# Patient Record
Sex: FEMALE | Race: WHITE | NOT HISPANIC OR LATINO | Employment: FULL TIME | URBAN - METROPOLITAN AREA
[De-identification: names, ages, dates, MRNs, and addresses within clinical notes are randomized per-mention and may not be internally consistent; named-entity substitution may affect disease eponyms.]

---

## 2017-04-02 ENCOUNTER — HOSPITAL ENCOUNTER (INPATIENT)
Facility: HOSPITAL | Age: 52
LOS: 5 days | Discharge: HOME | DRG: 885 | End: 2017-04-07
Attending: PSYCHIATRY & NEUROLOGY | Admitting: PSYCHIATRY & NEUROLOGY
Payer: MEDICAID

## 2017-04-02 DIAGNOSIS — R45.851 DEPRESSION WITH SUICIDAL IDEATION: ICD-10-CM

## 2017-04-02 DIAGNOSIS — F33.2 MDD (MAJOR DEPRESSIVE DISORDER), RECURRENT SEVERE, WITHOUT PSYCHOSIS: Primary | ICD-10-CM

## 2017-04-02 DIAGNOSIS — F32.A DEPRESSION WITH SUICIDAL IDEATION: ICD-10-CM

## 2017-04-02 DIAGNOSIS — I10 ESSENTIAL HYPERTENSION: ICD-10-CM

## 2017-04-02 LAB — TSH SERPL DL<=0.005 MIU/L-ACNC: 1.69 UIU/ML

## 2017-04-02 PROCEDURE — 80061 LIPID PANEL: CPT

## 2017-04-02 PROCEDURE — 11400000 HC PSYCH PRIVATE ROOM

## 2017-04-02 PROCEDURE — 36415 COLL VENOUS BLD VENIPUNCTURE: CPT

## 2017-04-02 PROCEDURE — 86592 SYPHILIS TEST NON-TREP QUAL: CPT

## 2017-04-02 PROCEDURE — 25000003 PHARM REV CODE 250: Performed by: SURGERY

## 2017-04-02 PROCEDURE — 27000339 *HC DAILY SUPPLY KIT

## 2017-04-02 PROCEDURE — 82947 ASSAY GLUCOSE BLOOD QUANT: CPT

## 2017-04-02 PROCEDURE — 84443 ASSAY THYROID STIM HORMONE: CPT

## 2017-04-02 RX ORDER — IBUPROFEN 200 MG
1 TABLET ORAL DAILY
Status: DISCONTINUED | OUTPATIENT
Start: 2017-04-02 | End: 2017-04-07 | Stop reason: HOSPADM

## 2017-04-02 RX ORDER — OLANZAPINE 10 MG/2ML
10 INJECTION, POWDER, FOR SOLUTION INTRAMUSCULAR ONCE AS NEEDED
Status: ACTIVE | OUTPATIENT
Start: 2017-04-02 | End: 2017-04-02

## 2017-04-02 RX ORDER — HYDROXYZINE PAMOATE 50 MG/1
50 CAPSULE ORAL EVERY 6 HOURS PRN
Status: DISCONTINUED | OUTPATIENT
Start: 2017-04-02 | End: 2017-04-04

## 2017-04-02 RX ORDER — HYDROCHLOROTHIAZIDE 12.5 MG/1
25 TABLET ORAL DAILY
Status: ON HOLD | COMMUNITY
End: 2017-04-07

## 2017-04-02 RX ORDER — CITALOPRAM 20 MG/1
20 TABLET, FILM COATED ORAL DAILY
Status: ON HOLD | COMMUNITY
End: 2017-04-07 | Stop reason: HOSPADM

## 2017-04-02 RX ADMIN — NICOTINE 1 PATCH: 21 PATCH, EXTENDED RELEASE TRANSDERMAL at 05:04

## 2017-04-02 NOTE — IP AVS SNAPSHOT
[address] [address]  Phone: [phone]           Patient Discharge Instructions   Our goal is to set you up for success. This packet includes information on your condition, medications, and your home care.  It will help you care for yourself to prevent having to return to the hospital.     Please ask your nurse if you have any questions.      There are many details to remember when preparing to leave the hospital. Here is what you will need to do:    1. Take your medicine. If you are prescribed medications, review your Medication List on the following pages. You may have new medications to  at the pharmacy and others that you'll need to stop taking. Review the instructions for how and when to take your medications. Talk with your doctor or nurses if you are unsure of what to do.     2. Go to your follow-up appointments. Specific follow-up information is listed in the following pages. Your may be contacted by a nurse or clinical provider about future appointments. Be sure we have all of the phone numbers to reach you. Please contact your provider's office if you are unable to make an appointment.     3. Watch for warning signs. Your doctor or nurse will give you detailed warning signs to watch for and when to call for assistance. These instructions may also include educational information about your condition. If you experience any of warning signs to your health, call your doctor.               ** Verify the list of medication(s) below is accurate and up to date. Carry this with you in case of emergency. If your medications have changed, please notify your healthcare provider.             Medication List      START taking these medications        Additional Info                      buPROPion 150 MG TB24 tablet   Commonly known as:  WELLBUTRIN XL   Quantity:  30 tablet   Refills:  1   Dose:  150 mg    Last time this was given:  150 mg on 4/7/2017  8:09 AM    Instructions:  Take 1 tablet (150 mg total) by mouth once daily.     Begin Date    AM    Noon    PM    Bedtime       hydrOXYzine 100 MG capsule   Commonly known as:  VISTARIL   Quantity:  30 capsule   Refills:  1   Dose:  100 mg    Last time this was given:  100 mg on 4/6/2017  8:32 PM   Instructions:  Take 1 capsule (100 mg total) by mouth every evening.     Begin Date    AM    Noon    PM    Bedtime       nicotine 21 mg/24 hr   Commonly known as:  NICODERM CQ   Refills:  0   Dose:  1 patch    Last time this was given:  1 patch on 4/7/2017  8:10 AM   Instructions:  Place 1 patch onto the skin once daily.     Begin Date    AM    Noon    PM    Bedtime         CHANGE how you take these medications        Additional Info                      citalopram 20 MG tablet   Commonly known as:  CELEXA   Quantity:  45 tablet   Refills:  1   Dose:  30 mg   What changed:  how much to take    Last time this was given:  30 mg on 4/7/2017  8:09 AM   Instructions:  Take 1.5 tablets (30 mg total) by mouth once daily.     Begin Date    AM    Noon    PM    Bedtime         CONTINUE taking these medications        Additional Info                      hydrochlorothiazide 12.5 MG Tab   Commonly known as:  HYDRODIURIL   Quantity:  30 tablet   Refills:  1   Dose:  25 mg    Last time this was given:  25 mg on 4/7/2017  8:09 AM   Instructions:  Take 2 tablets (25 mg total) by mouth once daily.     Begin Date    AM    Noon    PM    Bedtime         STOP taking these medications     lisinopril-hydrochlorothiazide 10-12.5 mg per tablet   Commonly known as:  PRINZIDE,ZESTORETIC       meloxicam 15 MG tablet   Commonly known as:  MOBIC            Where to Get Your Medications      You can get these medications from any pharmacy     Bring a paper prescription for each of these medications     buPROPion 150 MG TB24 tablet    citalopram 20 MG tablet    hydrochlorothiazide 12.5 MG Tab    hydrOXYzine 100 MG capsule       You don't need a prescription for  "these medications     nicotine 21 mg/24 hr                  Please bring to all follow up appointments:    1. A copy of your discharge instructions.  2. All medicines you are currently taking in their original bottles.  3. Identification and insurance card.    Please arrive 15 minutes ahead of scheduled appointment time.    Please call 24 hours in advance if you must reschedule your appointment and/or time.        Follow-up Information     Follow up with [facility]    Contact information:    [facility]   Transitional Living   [phone]  Contact: Juan Millan         Follow up with [facility] On 4/13/2017.    Specialties:  Behavioral Health, Psychiatry, Psychology    Why:  Outpatient Psych Services, as scheduled AT 10 am     Contact information:    [address] [place] [address] [phone]          Discharge References/Attachments     BUPROPION EXTENDED-RELEASE TABLETS (DEPRESSION/MOOD DISORDERS) (ENGLISH)    CITALOPRAM TABLETS (ENGLISH)    SMOKING, HEALTH EFFECTS OF (ENGLISH)    SMOKING, GETTING SUPPORT FOR QUITTING (ENGLISH)    HYDROCHLOROTHIAZIDE, HCTZ CAPSULES OR TABLETS (ENGLISH)    HYDROXYZINE CAPSULES OR TABLETS (ENGLISH)    DEPRESSION, KNOW THE SIGNS AND SYMPTOMS (ENGLISH)    SUICIDE, RECOGNIZING WARNING SIGNS IN YOURSELF (ENGLISH)        Primary Diagnosis     Your primary diagnosis was:  Mood Problem      Admission Information     Date & Time Department CSN    4/2/2017  1:38 PM [facility] [id number]       Admisson Diagnosis: Depression with suicidal ideation      Care Providers     Not on file      Your Vitals Were     BP Pulse Temp Resp Height Weight    145/89 (BP Location: Left arm, Patient Position: Sitting, BP Method: Automatic) 71 97.1 °F (36.2 °C) (Oral) 18 5' 5" (1.651 m) 121.8 kg (268 lb 8 oz)    BMI                44.68 kg/m2          Recent Lab Values     No lab values to display.      Blood Glucose and Lipid Panel Labs        " 4/2/2017                           4:33 PM           Cholesterol 275 (H)           Triglycerides 194 (H)           HDL Cholesterol 41           LDL Cholesterol 195.2 (H)           HDL/Cholesterol Ratio 14.9 (L)           Total Cholesterol/HDL Ratio 6.7 (H)           Non-HDL Cholesterol 234           Comment for Cholesterol at  4:33 PM on 4/2/2017:  The National Cholesterol Education Program (NCEP) has set the  following guidelines (reference ranges) for Cholesterol:  Optimal.....................<200 mg/dL  Borderline High.............200-239 mg/dL  High........................> or = 240 mg/dL      Comment for Triglycerides at  4:33 PM on 4/2/2017:  The National Cholesterol Education Program (NCEP) has set the  following guidelines (reference values) for triglycerides:  Normal......................<150 mg/dL  Borderline High.............150-199 mg/dL  High........................200-499 mg/dL      Comment for HDL Cholesterol at  4:33 PM on 4/2/2017:  The National Cholesterol Education Program (NCEP) has set the  following guidelines (reference values) for HDL Cholesterol:  Low...............<40 mg/dL  Optimal...........>60 mg/dL      Comment for LDL Cholesterol at  4:33 PM on 4/2/2017:  The National Cholesterol Education Program (NCEP) has set the  following guidelines (reference values) for LDL Cholesterol:  Optimal.......................<130 mg/dL  Borderline High...............130-159 mg/dL  High..........................160-189 mg/dL  Very High.....................>190 mg/dL      Comment for Non-HDL Cholesterol at  4:33 PM on 4/2/2017:  Risk category and Non-HDL cholesterol goals:  Coronary heart disease (CHD)or equivalent (10-year risk of CHD >20%):  Non-HDL cholesterol goal     <130 mg/dL  Two or more CHD risk factors and 10-year risk of CHD <= 20%:  Non-HDL cholesterol goal     <160 mg/dL  0 to 1 CHD risk factor:  Non-HDL cholesterol goal     <190 mg/dL        Allergies as of 4/7/2017     No Known Allergies       Advance Directives     An advance directive is a document which, in the event you are no longer able to make decisions for yourself, tells your healthcare team what kind of treatment you do or do not want to receive, or who you would like to make those decisions for you.  If you do not currently have an advance directive, [facility] encourages you to create one.  For more information call:  [phone]), [phone] ([phone]),  or log on to [email or web address].        Advance Directive Status          Most Recent Value    Advance Directive Status  Patient does not have Advance Directive, declines information.      Language Assistance Services     ATTENTION: Language assistance services are available, free of charge. Please call [phone].      ATENCIÓN: Si habla español, tiene a tomas disposición servicios gratuitos de asistencia lingüística. Llame al [phone].     CHÚ Ý: N?u b?n nói Ti?ng Vi?t, có các d?ch v? h? tr? ngôn ng? mi?n phí dành cho b?n. G?i s? [phone].        National Suicide Prevention Lifeline     If you or someone you know is thinking about suicide, call the National Suicide Prevention Lifeline.  National Suicide Hotline: 9-953-382-TALK (4688)  The lifeline is free, confidential and always available.  Help a loved one, a friend or yourself.  www.suicideprevenetionlifeline.org          [facility] Sign-Up     Activating your [facility] account is as easy as 1-2-3!     1) Visit [email or web address], select Sign Up Now, enter this activation code and your date of birth, then select Next.  [email or web address]  Expires: 5/20/2017  2:05 PM      2) Create a username and password to use when you visit [facility] in the future and select a security question in case you lose your password and select Next.    3) Enter your e-mail address and click Sign Up!    Additional Information  If you have questions, please e-mail [facility] [email or web address] or call [phone] to talk to our  [facility] staff. Remember, [facility] is NOT to be used for urgent needs. For medical emergencies, dial 911.          [facility] complies with applicable Federal civil rights laws and does not discriminate on the basis of race, color, national origin, age, disability, or sex.

## 2017-04-03 PROBLEM — I10 ESSENTIAL HYPERTENSION: Status: ACTIVE | Noted: 2017-04-03

## 2017-04-03 LAB
CHOLEST/HDLC SERPL: 6.7 {RATIO}
GLUCOSE SERPL-MCNC: 104 MG/DL
HDL/CHOLESTEROL RATIO: 14.9 %
HDLC SERPL-MCNC: 275 MG/DL
HDLC SERPL-MCNC: 41 MG/DL
LDLC SERPL CALC-MCNC: 195.2 MG/DL
NONHDLC SERPL-MCNC: 234 MG/DL
RPR SER QL: NORMAL
TRIGL SERPL-MCNC: 194 MG/DL

## 2017-04-03 PROCEDURE — 11400000 HC PSYCH PRIVATE ROOM

## 2017-04-03 PROCEDURE — 27000339 *HC DAILY SUPPLY KIT

## 2017-04-03 PROCEDURE — 99232 SBSQ HOSP IP/OBS MODERATE 35: CPT | Mod: ,,, | Performed by: INTERNAL MEDICINE

## 2017-04-03 PROCEDURE — 36415 COLL VENOUS BLD VENIPUNCTURE: CPT

## 2017-04-03 PROCEDURE — 90833 PSYTX W PT W E/M 30 MIN: CPT | Mod: AF,S$PBB,, | Performed by: PSYCHIATRY & NEUROLOGY

## 2017-04-03 PROCEDURE — 25000003 PHARM REV CODE 250: Performed by: SURGERY

## 2017-04-03 PROCEDURE — 25000003 PHARM REV CODE 250: Performed by: PSYCHIATRY & NEUROLOGY

## 2017-04-03 PROCEDURE — 99223 1ST HOSP IP/OBS HIGH 75: CPT | Mod: AF,S$PBB,, | Performed by: PSYCHIATRY & NEUROLOGY

## 2017-04-03 RX ORDER — ACETAMINOPHEN 325 MG/1
650 TABLET ORAL EVERY 6 HOURS PRN
Status: DISCONTINUED | OUTPATIENT
Start: 2017-04-03 | End: 2017-04-07 | Stop reason: HOSPADM

## 2017-04-03 RX ORDER — LOPERAMIDE HYDROCHLORIDE 2 MG/1
2 CAPSULE ORAL
Status: DISCONTINUED | OUTPATIENT
Start: 2017-04-03 | End: 2017-04-07 | Stop reason: HOSPADM

## 2017-04-03 RX ORDER — MAG HYDROX/ALUMINUM HYD/SIMETH 200-200-20
30 SUSPENSION, ORAL (FINAL DOSE FORM) ORAL EVERY 6 HOURS PRN
Status: DISCONTINUED | OUTPATIENT
Start: 2017-04-03 | End: 2017-04-07 | Stop reason: HOSPADM

## 2017-04-03 RX ORDER — CITALOPRAM 10 MG/1
20 TABLET ORAL DAILY
Status: DISCONTINUED | OUTPATIENT
Start: 2017-04-03 | End: 2017-04-04

## 2017-04-03 RX ORDER — OLANZAPINE 10 MG/1
10 TABLET ORAL EVERY 4 HOURS PRN
Status: DISCONTINUED | OUTPATIENT
Start: 2017-04-03 | End: 2017-04-07 | Stop reason: HOSPADM

## 2017-04-03 RX ORDER — HYDROCHLOROTHIAZIDE 25 MG/1
25 TABLET ORAL DAILY
Status: DISCONTINUED | OUTPATIENT
Start: 2017-04-03 | End: 2017-04-07 | Stop reason: HOSPADM

## 2017-04-03 RX ORDER — IBUPROFEN 200 MG
1 TABLET ORAL DAILY PRN
Status: DISCONTINUED | OUTPATIENT
Start: 2017-04-03 | End: 2017-04-03

## 2017-04-03 RX ORDER — DOCUSATE SODIUM 100 MG/1
100 CAPSULE, LIQUID FILLED ORAL DAILY PRN
Status: DISCONTINUED | OUTPATIENT
Start: 2017-04-03 | End: 2017-04-07 | Stop reason: HOSPADM

## 2017-04-03 RX ORDER — OLANZAPINE 10 MG/2ML
10 INJECTION, POWDER, FOR SOLUTION INTRAMUSCULAR EVERY 4 HOURS PRN
Status: DISCONTINUED | OUTPATIENT
Start: 2017-04-03 | End: 2017-04-07 | Stop reason: HOSPADM

## 2017-04-03 RX ORDER — BUPROPION HYDROCHLORIDE 150 MG/1
150 TABLET ORAL DAILY
Status: DISCONTINUED | OUTPATIENT
Start: 2017-04-03 | End: 2017-04-06

## 2017-04-03 RX ORDER — FOLIC ACID 1 MG/1
1 TABLET ORAL DAILY
Status: DISCONTINUED | OUTPATIENT
Start: 2017-04-03 | End: 2017-04-03

## 2017-04-03 RX ADMIN — HYDROXYZINE PAMOATE 50 MG: 50 CAPSULE ORAL at 11:04

## 2017-04-03 RX ADMIN — CITALOPRAM HYDROBROMIDE 20 MG: 10 TABLET ORAL at 11:04

## 2017-04-03 RX ADMIN — HYDROCHLOROTHIAZIDE 25 MG: 25 TABLET ORAL at 11:04

## 2017-04-03 RX ADMIN — NICOTINE 1 PATCH: 21 PATCH, EXTENDED RELEASE TRANSDERMAL at 08:04

## 2017-04-03 RX ADMIN — BUPROPION HYDROCHLORIDE 150 MG: 150 TABLET, FILM COATED, EXTENDED RELEASE ORAL at 11:04

## 2017-04-03 RX ADMIN — THERA TABS 1 TABLET: TAB at 11:04

## 2017-04-03 NOTE — PLAN OF CARE
"Problem: Patient Care Overview (Adult)  Goal: Interdisciplinary Rounds/Family Conf  Outcome: Ongoing (interventions implemented as appropriate)  Treatment Team Update:     Chief Complaint:  Pt was PEC'd for depression with SI and a plan. Pt wrote a suicide note apologizing to her family and giving away her belongings.     Review of Progress/Goals:  Under financial stress. States she has been laid off 6 weeks; been receiving unemployment- about 200 a month. States she was unable to pay her rent March 1 and was evicted so is homeless. States her mother has been sick for 5 and half yrs since she had a stroke and her dad is her care giver. They live in [place].  Not living near her parents has not been able to help, I guess it makes me feel guilty. Has 2 brothers and a friend she could possibly stay with. States  loneliness, sadness, frustration, and no outlet to vent are her stressors. Plus she's been dealing with "a sort of crazy relationship with a artemio that's been in and out of [facility]. He will be released in august -I've spent a lotta money on him thru out the years. Didn't give him anything last year. I'm sure he anticipates coming home to wherever i'll be at that timte. It's a toxic relationship and I don't know how to end it."   Patient states she worked at  in [employer]; previously in [facility] 10 yrs. Recent job as  - civil proceedings -was there year and half  - had worked there before 15yrs. "They couldnt meet payroll, budget issue, there were layoffs;   Is aware of LRS - been in that for 4 weeks   Patient unsure of where she will go when discharged, states she can't live with her parents, and is very limited; but doesn't think she wd have to go to a shelter.     Affect/Mood:  Improved appearance (showered, dressed).   Constricted, tearful      Thought Process:  Linear     Medication Current/Changes:  Non compliant with medications; started it again in January 20mg "   Blood pressure medicine         Revisions to Goals:     Estimated LOS:  3-7 days      Discharge Plan:  D/C to      Referrals:  LRS   [facility] ([place] [place])

## 2017-04-03 NOTE — PLAN OF CARE
Problem: Overarching Goals (Adult)  Goal: Develops/Participates in Therapeutic Gratis to Support Successful Transition  Outcome: Ongoing (interventions implemented as appropriate)  Group Note     Behavior:              Intervention:  Twenty Questions            Response:              Plan:

## 2017-04-03 NOTE — PLAN OF CARE
Problem: Patient Care Overview (Adult)  Goal: Plan of Care Review  Outcome: Ongoing (interventions implemented as appropriate)  Out on the unit in dayroom watching TV, quiet.  Encouraged to verbalize feelings.  Did state that she hopes to be able to come to resolutions while here.  Admits to feeling hopeless and feeling she had nowhere to turn.  Praised her for staying out of her room and being a part of the happenings on the unit.  Will continue to monitor.  Safety and precautions maintained, bed low and pathway kept clear.

## 2017-04-03 NOTE — NURSING
"Pt arrived to [facility] via wheelchair per transportation company from [facility] ER accompanied by staff and security. All belongings searched. No contraband found. Calm and cooperative. Pt was PEC'd due to suicidal ideations with a plan. She wrote a suicide note. Tearful and unkempt. Pt stated, "I am just at the end of my rope. I don't know where to go or where to turn. I was evicted from my home. I knew I had to leave by Monday. All my stuff was moved out today. All of a sudden reality just hit me." Pt was laid off from her job at the [employer] office 6 weeks ago. She has been unable to pay her bills on the money she has been getting from unemployment. Pt reports she has no support system. She has a 35 year old son. Her parents are elderly and ill. Pt is . No history of psychiatric inpatient care. No history of attempts. Has been taking Celexa and HCTZ  as prescribed. Pt reports it has not been helping her depression. Pt denies SI or HI or plans at this time. Oriented to unit and rules. Pt rights reviewed. Pt reports she needs help with depression, housing and a job.   "
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
## 2017-04-03 NOTE — SUBJECTIVE & OBJECTIVE
Past Medical History:   Diagnosis Date    Hypertension        History reviewed. No pertinent surgical history.    Review of patient's allergies indicates:  No Known Allergies    No current facility-administered medications on file prior to encounter.      Current Outpatient Prescriptions on File Prior to Encounter   Medication Sig    meloxicam (MOBIC) 15 MG tablet Take 15 mg by mouth once daily.    [DISCONTINUED] lisinopril-hydrochlorothiazide (PRINZIDE,ZESTORETIC) 10-12.5 mg per tablet Take 1 tablet by mouth once daily.     Family History     None        Social History Main Topics    Smoking status: Current Every Day Smoker     Packs/day: 1.00    Smokeless tobacco: Not on file    Alcohol use Not on file    Drug use: Not on file    Sexual activity: Not on file     Review of Systems   Constitutional: Negative for chills, fatigue, fever and unexpected weight change.   HENT: Negative for congestion, ear pain, sore throat and trouble swallowing.    Eyes: Negative for pain and visual disturbance.   Respiratory: Negative for cough, chest tightness and shortness of breath.    Cardiovascular: Negative for chest pain, palpitations and leg swelling.   Gastrointestinal: Negative for abdominal distention, abdominal pain, constipation, diarrhea and vomiting.   Genitourinary: Negative for difficulty urinating, dysuria, flank pain, frequency and hematuria.   Musculoskeletal: Negative for back pain, gait problem, joint swelling, neck pain and neck stiffness.   Skin: Negative for rash and wound.   Neurological: Negative for dizziness, seizures, speech difficulty, light-headedness and headaches.     Objective:     Vital Signs (Most Recent):  Temp: 96.8 °F (36 °C) (04/03/17 0825)  Pulse: 76 (04/03/17 0825)  Resp: 18 (04/03/17 0825)  BP: (!) 141/85 (04/03/17 0825) Vital Signs (24h Range):  Temp:  [96.2 °F (35.7 °C)-97.5 °F (36.4 °C)] 96.8 °F (36 °C)  Pulse:  [54-76] 76  Resp:  [16-18] 18  BP: (138-154)/(82-88) 141/85      Weight: 119.7 kg (264 lb)  Body mass index is 43.93 kg/(m^2).    Physical Exam   Constitutional: She is oriented to person, place, and time. She appears well-developed and well-nourished.   HENT:   Head: Normocephalic and atraumatic.   Neck: No thyromegaly present.   Cardiovascular: Normal rate, regular rhythm, normal heart sounds and intact distal pulses.    No murmur heard.  Pulmonary/Chest: Effort normal and breath sounds normal. No respiratory distress. She has no wheezes. She has no rales.   Abdominal: Soft. Bowel sounds are normal. She exhibits no distension and no mass. There is no tenderness.   Musculoskeletal: Normal range of motion. She exhibits no edema.   Lymphadenopathy:     She has no cervical adenopathy.   Neurological: She is alert and oriented to person, place, and time.     Neuro: Cranial nerves:  CN II Visual fields full to confrontation.   CN III, IV, VI Pupils are equal, round, and reactive to light.  CN III: no palsy  Nystagmus: none   Diplopia: none  Ophthalmoparesis: none  CN V Facial sensation intact.   CN VII Facial expression full, symmetric.   CN VIII normal.   CN IX normal.   CN X normal.   CN XI normal.   CN XII normal.         Skin: Skin is warm and dry. No erythema.   Vitals reviewed.      Significant Labs:   CBC: wbc 11, h/h 15/46, plt 232  CMP: Na 139, K 3.8, BUN/creat 12/0.8, glucose 104  U/A -  LFT WNL  UDS -  acetaminophen <0.6  Salicylate <5.0  Etoh 76  Lab Results   Component Value Date    TSH 1.689 04/02/2017

## 2017-04-03 NOTE — CONSULTS
[facility] Hospital Medicine  Consult Note    Patient Name: Selam Bird  MRN: [id number]  Admission Date: 4/2/2017  Hospital Length of Stay: 1 days  Attending Physician: Raj Rubio MD   Primary Care Provider: Darryl Tobias NP           Patient information was obtained from patient and ER records.     Inpatient consult to [facility] for History and Physical  Consult performed by: MATEO FONSECA  Consult ordered by: RAJ RUBIO        Subjective:     Principal Problem: <principal problem not specified>    Chief Complaint: No chief complaint on file.       HPI: Pt was PEC'd due to suicidal ideations with a plan. She wrote a suicide note.    She was admitted to [facility] and medicine consulted for H/P. VSS/afebrile. She is on HCTZ at home and usually has well controlled bp      Past Medical History:   Diagnosis Date    Hypertension        History reviewed. No pertinent surgical history.    Review of patient's allergies indicates:  No Known Allergies    No current facility-administered medications on file prior to encounter.      Current Outpatient Prescriptions on File Prior to Encounter   Medication Sig    meloxicam (MOBIC) 15 MG tablet Take 15 mg by mouth once daily.    [DISCONTINUED] lisinopril-hydrochlorothiazide (PRINZIDE,ZESTORETIC) 10-12.5 mg per tablet Take 1 tablet by mouth once daily.     Family History     None        Social History Main Topics    Smoking status: Current Every Day Smoker     Packs/day: 1.00    Smokeless tobacco: Not on file    Alcohol use Not on file    Drug use: Not on file    Sexual activity: Not on file     Review of Systems   Constitutional: Negative for chills, fatigue, fever and unexpected weight change.   HENT: Negative for congestion, ear pain, sore throat and trouble swallowing.    Eyes: Negative for pain and visual disturbance.   Respiratory: Negative for cough, chest tightness and shortness of breath.    Cardiovascular: Negative  for chest pain, palpitations and leg swelling.   Gastrointestinal: Negative for abdominal distention, abdominal pain, constipation, diarrhea and vomiting.   Genitourinary: Negative for difficulty urinating, dysuria, flank pain, frequency and hematuria.   Musculoskeletal: Negative for back pain, gait problem, joint swelling, neck pain and neck stiffness.   Skin: Negative for rash and wound.   Neurological: Negative for dizziness, seizures, speech difficulty, light-headedness and headaches.     Objective:     Vital Signs (Most Recent):  Temp: 96.8 °F (36 °C) (04/03/17 0825)  Pulse: 76 (04/03/17 0825)  Resp: 18 (04/03/17 0825)  BP: (!) 141/85 (04/03/17 0825) Vital Signs (24h Range):  Temp:  [96.2 °F (35.7 °C)-97.5 °F (36.4 °C)] 96.8 °F (36 °C)  Pulse:  [54-76] 76  Resp:  [16-18] 18  BP: (138-154)/(82-88) 141/85     Weight: 119.7 kg (264 lb)  Body mass index is 43.93 kg/(m^2).    Physical Exam   Constitutional: She is oriented to person, place, and time. She appears well-developed and well-nourished.   HENT:   Head: Normocephalic and atraumatic.   Neck: No thyromegaly present.   Cardiovascular: Normal rate, regular rhythm, normal heart sounds and intact distal pulses.    No murmur heard.  Pulmonary/Chest: Effort normal and breath sounds normal. No respiratory distress. She has no wheezes. She has no rales.   Abdominal: Soft. Bowel sounds are normal. She exhibits no distension and no mass. There is no tenderness.   Musculoskeletal: Normal range of motion. She exhibits no edema.   Lymphadenopathy:     She has no cervical adenopathy.   Neurological: She is alert and oriented to person, place, and time.     Neuro: Cranial nerves:  CN II Visual fields full to confrontation.   CN III, IV, VI Pupils are equal, round, and reactive to light.  CN III: no palsy  Nystagmus: none   Diplopia: none  Ophthalmoparesis: none  CN V Facial sensation intact.   CN VII Facial expression full, symmetric.   CN VIII normal.   CN IX normal.   CN  X normal.   CN XI normal.   CN XII normal.         Skin: Skin is warm and dry. No erythema.   Vitals reviewed.      Significant Labs:   CBC: wbc 11, h/h 15/46, plt 232  CMP: Na 139, K 3.8, BUN/creat 12/0.8, glucose 104  U/A -  LFT WNL  UDS -  acetaminophen <0.6  Salicylate <5.0  Etoh 76  Lab Results   Component Value Date    TSH 1.689 04/02/2017           Assessment/Plan:     Depression with suicidal ideation  Further orders per psych      Essential hypertension  Cont HCTZ      VTE Risk Mitigation     None        Thank you for your consult. I will sign off. Please contact us if you have any additional questions.    No Coronel NP  Department of Hospital Medicine   [facility]

## 2017-04-03 NOTE — PLAN OF CARE
Problem: Patient Care Overview (Adult)  Goal: Plan of Care Review  Outcome: Ongoing (interventions implemented as appropriate)  Pt out on unit using phone.Pt participating in groups.Reports sleeping ok last night.Hygiene and grooming good.Mood depressed but currently no si.Medication compliant.

## 2017-04-03 NOTE — PLAN OF CARE
Problem: Patient Care Overview (Adult)  Goal: Plan of Care Review  Outcome: Ongoing (interventions implemented as appropriate)  Lying quiet in bed, eyes closed, respiration even and unlabored, appearing asleep.  Slept most all shift with one awakening at approx midnight to 0130.    Safety and precautions maintained with rounds every 15 minutes, bed is fixed in a low position and pathways kept clear.  No fall occurred. Slept approx 6.5 hours.

## 2017-04-03 NOTE — NURSING
Dr. Rubio accepted pt for admission. Received report from Doretha RICK at [facility] ER. Pt was PEC'd for depression with SI and a plan. Pt wrote a suicide note apologizing to her family and giving away her belongings. Pt was medically cleared.
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
## 2017-04-04 PROCEDURE — 25000003 PHARM REV CODE 250: Performed by: SURGERY

## 2017-04-04 PROCEDURE — 25000003 PHARM REV CODE 250: Performed by: PSYCHIATRY & NEUROLOGY

## 2017-04-04 PROCEDURE — 90833 PSYTX W PT W E/M 30 MIN: CPT | Mod: AF,S$PBB,, | Performed by: PSYCHIATRY & NEUROLOGY

## 2017-04-04 PROCEDURE — 99233 SBSQ HOSP IP/OBS HIGH 50: CPT | Mod: AF,S$PBB,, | Performed by: PSYCHIATRY & NEUROLOGY

## 2017-04-04 PROCEDURE — 11400000 HC PSYCH PRIVATE ROOM

## 2017-04-04 PROCEDURE — 27000339 *HC DAILY SUPPLY KIT

## 2017-04-04 RX ORDER — HYDROXYZINE PAMOATE 50 MG/1
50 CAPSULE ORAL EVERY 6 HOURS PRN
Status: DISCONTINUED | OUTPATIENT
Start: 2017-04-04 | End: 2017-04-04

## 2017-04-04 RX ORDER — CITALOPRAM 10 MG/1
30 TABLET ORAL DAILY
Status: DISCONTINUED | OUTPATIENT
Start: 2017-04-05 | End: 2017-04-07 | Stop reason: HOSPADM

## 2017-04-04 RX ORDER — HYDROXYZINE PAMOATE 50 MG/1
100 CAPSULE ORAL NIGHTLY PRN
Status: DISCONTINUED | OUTPATIENT
Start: 2017-04-04 | End: 2017-04-06

## 2017-04-04 RX ORDER — HYDROXYZINE PAMOATE 50 MG/1
50 CAPSULE ORAL EVERY 6 HOURS PRN
Status: DISCONTINUED | OUTPATIENT
Start: 2017-04-04 | End: 2017-04-07 | Stop reason: HOSPADM

## 2017-04-04 RX ADMIN — HYDROXYZINE PAMOATE 100 MG: 50 CAPSULE ORAL at 09:04

## 2017-04-04 RX ADMIN — NICOTINE 1 PATCH: 21 PATCH, EXTENDED RELEASE TRANSDERMAL at 08:04

## 2017-04-04 RX ADMIN — THERA TABS 1 TABLET: TAB at 08:04

## 2017-04-04 RX ADMIN — CITALOPRAM HYDROBROMIDE 20 MG: 10 TABLET ORAL at 08:04

## 2017-04-04 RX ADMIN — BUPROPION HYDROCHLORIDE 150 MG: 150 TABLET, FILM COATED, EXTENDED RELEASE ORAL at 08:04

## 2017-04-04 RX ADMIN — HYDROCHLOROTHIAZIDE 25 MG: 25 TABLET ORAL at 08:04
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
## 2017-04-04 NOTE — PLAN OF CARE
"  Treatment Recommendation:   1:1 Intervention (as needed)    Cognitive Stimulation Skilled Activity  Creative Expression Skilled Activity  Mild Exercises Skilled Activity  Stress Management Skilled Activity  Coping Skilled Activity  Music Skilled Activity    Treatment Goal(s):  Long Term Goals Refer To Master Treatment Plan    Short Term Treatment Goal(s)  Patient Will:  Exhibit Improvement in Mood  Demonstrate Constructive Expression of Feelings and Behavior  Identify at Least 2 Coping Skills or Leisure Skills to Reduce Depression and Hopelessness Upon Request from Therapist    Discharge Recommendations:  Encourage Patient to Utilize Coping Skills on a Regular Basis to Reduce the Risk of Decompensating and Re-Hospitalizations  Follow Up with After Care Appointments  Continue with Current Leisure Activities     Patient presents with less improved affect and "Feel good mood." Patient reports increase depression and stress due to job loss 6 weeks ago and being evicted from her rent house Sunday. Patient reports she is , currently single, has a high school education, wears reading glasses, is unemployed, recently evicted from home in [facility]. Patient verbalized main goal "Decrease stress, get finances in order and get a job."  "

## 2017-04-04 NOTE — PROGRESS NOTES
PSYCHIATRY DAILY INPATIENT PROGRESS NOTE  SUBSEQUENT HOSPITAL VISIT    ENCOUNTER DATE: 4/4/2017  SITE: [facility]    DATE OF ADMISSION: 4/2/2017  1:38 PM  LENGTH OF STAY: 2 days      HISTORY    CHIEF COMPLAINT   Selam Bird is a 51 y.o. female, seen during daily [place]ane[place] rounds on the inpatient unit.  Selam Bird presents with the chief complaint of Depression and suicidal ideation    HPI   (Elements: Location, Quality, Severity, Duration, Timing, Content, Modifying Factors, Associated Signs & Symptoms)    The patient was seen and examined. The chart was reviewed.    Staff reports no behavioral or management issues.     The patient has been compliant with treatment. The patient denied any side effects.    Continued Symptoms of Depression: +diminished mood and loss of interest/anhedonia; ++irritability, +diminished energy, +change in sleep, denies change in appetite, +diminished concentration or cognition or indecisiveness, denies PMA/R, +excessive guilt or +hopelessness or +worthlessness, +suicidal ideations      Continued Trouble with Sleep: less trouble with initiation, less difficulty with maintenance, denies early morning awakening with inability to return to sleep      Continued but less Suicidal ideations: less active ideations; denies organized plans, less future intentions; denies homicidal ideations      Denies Symptoms of psychosis: denies hallucinations, denies delusions, denies disorganized thinking, denies disorganized behavior or abnormal motor behavior, or negative symptoms       Denies Symptoms of ry or hypomania: denies elevated, expansive, or irritable mood with increased energy or activity; with no inflated self-esteem or grandiosity, denies decreased need for sleep, denies increased rate of speech, FOI or racing thoughts, denies distractibility, increased goal directed activity or PMA, denies risky/disinhibited behavior      Continued Symptoms of LISA: less excessive anxiety/worry/fear, with  less restlessness, denies fatigue, less poor concentration, less irritability, denies muscle tension, less sleep disturbance; denies causes functionally impairing distress         PSYCHOTHERAPY ADD-ON +[id number]   30 (16-37*) minutes    Time: 16 minutes  Participants: Met with patient    Therapeutic Intervention Type: behavior modifying psychotherapy, supportive psychotherapy  Why chosen therapy is appropriate versus another modality: relevant to diagnosis, patient responds to this modality, evidence based practice    Target symptoms: depression, anxiety   Primary focus: psychosocial stressors  Psychotherapeutic techniques: supportive, behavioral and cognitive techniques; psycho-education    Outcome monitoring methods: self-report, observation    Patient's response to intervention:  The patient's response to intervention is accepting.    Progress toward goals:  The patient's progress toward goals is fair .          ROS  General ROS: negative  Ophthalmic ROS: negative  ENT ROS: negative  Allergy and Immunology ROS: negative  Hematological and Lymphatic ROS: negative  Endocrine ROS: negative  Respiratory ROS: no cough, shortness of breath, or wheezing  Cardiovascular ROS: no chest pain or dyspnea on exertion  Gastrointestinal ROS: no abdominal pain, change in bowel habits, or black or bloody stools  Genito-Urinary ROS: no dysuria, trouble voiding, or hematuria  Musculoskeletal ROS: negative  Neurological ROS: no TIA or stroke symptoms  Dermatological ROS: negative    PAST MEDICAL HISTORY   Past Medical History:   Diagnosis Date    Hypertension            PSYCHOTROPIC MEDICATIONS   Scheduled Meds:   buPROPion  150 mg Oral Daily    citalopram  20 mg Oral Daily    hydrochlorothiazide  25 mg Oral Daily    multivitamin  1 tablet Oral Daily    nicotine  1 patch Transdermal Daily     Continuous Infusions:   PRN Meds:.acetaminophen, aluminum-magnesium hydroxide-simethicone, docusate sodium, hydrOXYzine pamoate, loperamide,  "olanzapine **AND** olanzapine        EXAMINATION    VITALS   Vitals:    04/04/17 0742   BP: (!) 152/72   Pulse: (!) 57   Resp: 18   Temp: 96.2 °F (35.7 °C)       CONSTITUTIONAL  General Appearance: WF, obese, in casual attire; NAD     MUSCULOSKELETAL  Muscle Strength and Tone: normal  Abnormal Involuntary Movements: none  Gait and Station: normal; non-ataxic     PSYCHIATRIC   Level of Consciousness: awake, alert  Orientation: p/p/t/s  Grooming: adequate to circumstances  Psychomotor Behavior: no PMA/R  Speech: nl r/t/v/s  Language: English fluent  Mood: "down but a little better"  Affect: less tearful, despondent, and anxious  Thought Process: linear and organized  Associations: intact; no ABBY  Thought Content: denied AVH/delusions; denied HI, less SI  Memory: intact to recent and remote events  Attention: intact to conversation; not distractible   Fund of Knowledge: age and education appropriate  Estimate if Intelligence: average based on work/education history, vocabulary and mental status exam  Insight: good- seeks help, recognizes symptoms  Judgment: good- no bx issues, compliant and cooperative    DIAGNOSTIC TESTING   Laboratory Results  No results found for this or any previous visit (from the past 24 hour(s)).      ASSESSMENT      IMPRESSION   MDD, recurrent, severe, without psychotic features  LISA  Nicotine Dependence      Psychosocial stressors              MEDICAL DECISION MAKING         PROBLEM LIST AND MANAGEMENT PLANS   Depression   Anxiety  Nicotine use  Psychosocial stressors     PRESCRIPTION DRUG MANAGEMENT  Compliance: yes  Side Effects: no  Regimen Adjustments:   Celexa to 30 mg po q day for depression and anxiety  Wellbutrin  mg po q day for depression and nicotine cessation     Psychosocial stressors: refer to [place] [employer]/Rehabilitation and/or temp service; patient on unemployment benefits; housing- will look into options (family, friends, ?); patient will try to sign up for Medicaid; will " refer for psychotherapy as outpatient        DIAGNOSTIC TESTING  Labs reviewed; follow up pending labs     Disposition:  -SW to assist with aftercare planning and collateral  -Once stable discharge home with outpatient follow up care and/or rehab  -Continue inpatient treatment under a PEC and/or CEC for danger to self as evident by depression with SI.       NEED FOR CONTINUED HOSPITALIZATION  Psychiatric illness continues to pose a potential threat to life or bodily function, of self or others, thereby requiring the need for continued inpatient psychiatric hospitalization: Yes    Protective inpatient pyschiatric hospitalization required while a safe disposition plan is enacted: Yes    Patient stabilized and ready for discharge from inpatient psychiatric unit: No      STAFF:   Raj Rubio MD  Psychiatry

## 2017-04-04 NOTE — PLAN OF CARE
Problem: Patient Care Overview (Adult)  Goal: Plan of Care Review  Outcome: Ongoing (interventions implemented as appropriate)  Pt is sleeping at this time and has slept 7 hours with one interruption.  Pt did receive hydroxyzine pamoate 50 mg po last night for c/o insomnia.  Med was effective.  NAD.  Resp even & unlabored.  Pathways clear and bed is low.  Q 15 minute safety checks ongoing.  All precautions maintained.

## 2017-04-04 NOTE — PLAN OF CARE
Problem: Patient Care Overview (Adult)  Goal: Plan of Care Review  Outcome: Ongoing (interventions implemented as appropriate)  Pt out on unit and participating in activities.Reports poor sleep last night.A air mattress placed on pt bed for comfort.Mood improve some and denies suicidal thoughts.Medication compliant.

## 2017-04-05 PROCEDURE — 25000003 PHARM REV CODE 250: Performed by: SURGERY

## 2017-04-05 PROCEDURE — 27000339 *HC DAILY SUPPLY KIT

## 2017-04-05 PROCEDURE — 90833 PSYTX W PT W E/M 30 MIN: CPT | Mod: AF,S$PBB,, | Performed by: PSYCHIATRY & NEUROLOGY

## 2017-04-05 PROCEDURE — 11400000 HC PSYCH PRIVATE ROOM

## 2017-04-05 PROCEDURE — 99233 SBSQ HOSP IP/OBS HIGH 50: CPT | Mod: AF,S$PBB,, | Performed by: PSYCHIATRY & NEUROLOGY

## 2017-04-05 PROCEDURE — 25000003 PHARM REV CODE 250: Performed by: PSYCHIATRY & NEUROLOGY

## 2017-04-05 RX ADMIN — NICOTINE 1 PATCH: 21 PATCH, EXTENDED RELEASE TRANSDERMAL at 08:04

## 2017-04-05 RX ADMIN — HYDROCHLOROTHIAZIDE 25 MG: 25 TABLET ORAL at 08:04

## 2017-04-05 RX ADMIN — HYDROXYZINE PAMOATE 50 MG: 50 CAPSULE ORAL at 08:04

## 2017-04-05 RX ADMIN — CITALOPRAM HYDROBROMIDE 30 MG: 10 TABLET ORAL at 08:04

## 2017-04-05 RX ADMIN — THERA TABS 1 TABLET: TAB at 08:04

## 2017-04-05 RX ADMIN — BUPROPION HYDROCHLORIDE 150 MG: 150 TABLET, FILM COATED, EXTENDED RELEASE ORAL at 08:04

## 2017-04-05 NOTE — NURSING
Patient resting quietly in bed with eyes closed.  Respirations easy and unlabored appears asleep.  Slept well for 6 hours.  Safety maintained with rounds every 15 minutes. Bed at lowest position.  Path ways kept clear.  No fall occured .

## 2017-04-05 NOTE — PROGRESS NOTES
PSYCHIATRY DAILY INPATIENT PROGRESS NOTE  SUBSEQUENT HOSPITAL VISIT    ENCOUNTER DATE: 4/5/2017  SITE: [facility]    DATE OF ADMISSION: 4/2/2017  1:38 PM  LENGTH OF STAY: 3 days      HISTORY    CHIEF COMPLAINT   Selam Bird is a 51 y.o. female, seen during daily montanze rounds on the inpatient unit.  Selam Bird presents with the chief complaint of Depression and suicidal ideation    HPI   (Elements: Location, Quality, Severity, Duration, Timing, Content, Modifying Factors, Associated Signs & Symptoms)    The patient was seen and examined. The chart was reviewed.    Staff reports no behavioral or management issues.     The patient has been compliant with treatment. The patient denied any side effects.    Continued but less Symptoms of Depression: +diminished mood and loss of interest/anhedonia; ++irritability, +diminished energy, +change in sleep, denies change in appetite, +diminished concentration or cognition or indecisiveness, denies PMA/R, +excessive guilt or +hopelessness or +worthlessness, less suicidal ideations      LessTrouble with Sleep: less trouble with initiation, less difficulty with maintenance, denies early morning awakening with inability to return to sleep      Continued but less Suicidal ideations: less active ideations; denies organized plans, less future intentions; denies homicidal ideations      Denies Symptoms of psychosis: denies hallucinations, denies delusions, denies disorganized thinking, denies disorganized behavior or abnormal motor behavior, or negative symptoms       Denies Symptoms of ry or hypomania: denies elevated, expansive, or irritable mood with increased energy or activity; with no inflated self-esteem or grandiosity, denies decreased need for sleep, denies increased rate of speech, FOI or racing thoughts, denies distractibility, increased goal directed activity or PMA, denies risky/disinhibited behavior      Continued but less Symptoms of LISA: less excessive  anxiety/worry/fear, with less restlessness, denies fatigue, less poor concentration, less irritability, denies muscle tension, less sleep disturbance; denies causes functionally impairing distress         PSYCHOTHERAPY ADD-ON +[id number]   30 (16-37*) minutes    Time: 18 minutes  Participants: Met with patient    Therapeutic Intervention Type: behavior modifying psychotherapy, supportive psychotherapy  Why chosen therapy is appropriate versus another modality: relevant to diagnosis, patient responds to this modality, evidence based practice    Target symptoms: depression, anxiety   Primary focus: psychosocial stressors  Psychotherapeutic techniques: supportive, behavioral and cognitive techniques; psycho-education    Outcome monitoring methods: self-report, observation    Patient's response to intervention:  The patient's response to intervention is accepting.    Progress toward goals:  The patient's progress toward goals is good.          ROS  General ROS: negative  Ophthalmic ROS: negative  ENT ROS: negative  Allergy and Immunology ROS: negative  Hematological and Lymphatic ROS: negative  Endocrine ROS: negative  Respiratory ROS: no cough, shortness of breath, or wheezing  Cardiovascular ROS: no chest pain or dyspnea on exertion  Gastrointestinal ROS: no abdominal pain, change in bowel habits, or black or bloody stools  Genito-Urinary ROS: no dysuria, trouble voiding, or hematuria  Musculoskeletal ROS: negative  Neurological ROS: no TIA or stroke symptoms  Dermatological ROS: negative    PAST MEDICAL HISTORY   Past Medical History:   Diagnosis Date    Hypertension            PSYCHOTROPIC MEDICATIONS   Scheduled Meds:   buPROPion  150 mg Oral Daily    citalopram  30 mg Oral Daily    hydrochlorothiazide  25 mg Oral Daily    multivitamin  1 tablet Oral Daily    nicotine  1 patch Transdermal Daily     Continuous Infusions:   PRN Meds:.acetaminophen, aluminum-magnesium hydroxide-simethicone, docusate sodium,  "hydrOXYzine pamoate, hydrOXYzine pamoate, loperamide, olanzapine **AND** olanzapine        EXAMINATION    VITALS   Vitals:    04/05/17 0827   BP: 139/85   Pulse: 60   Resp: 18   Temp: 96.7 °F (35.9 °C)       CONSTITUTIONAL  General Appearance: WF, obese, in casual attire; NAD     MUSCULOSKELETAL  Muscle Strength and Tone: normal  Abnormal Involuntary Movements: none  Gait and Station: normal; non-ataxic     PSYCHIATRIC   Level of Consciousness: awake, alert  Orientation: p/p/t/s  Grooming: adequate to circumstances  Psychomotor Behavior: no PMA/R  Speech: nl r/t/v/s  Language: English fluent  Mood: "a little better"  Affect: less tearful, despondent, and anxious; improving  Thought Process: linear and organized  Associations: intact; no ABBY  Thought Content: denied AVH/delusions; denied HI, less SI  Memory: intact to recent and remote events  Attention: intact to conversation; not distractible   Fund of Knowledge: age and education appropriate  Estimate if Intelligence: average based on work/education history, vocabulary and mental status exam  Insight: good- seeks help, recognizes symptoms  Judgment: good- no bx issues, compliant and cooperative    DIAGNOSTIC TESTING   Laboratory Results  No results found for this or any previous visit (from the past 24 hour(s)).      ASSESSMENT      IMPRESSION   MDD, recurrent, severe, without psychotic features  LISA  Nicotine Dependence      Psychosocial stressors     Dyslipidemia         MEDICAL DECISION MAKING         PROBLEM LIST AND MANAGEMENT PLANS   Depression   Anxiety  Nicotine use  Psychosocial stressors   Dyslipidemia     PRESCRIPTION DRUG MANAGEMENT  Compliance: yes  Side Effects: no  Regimen Adjustments:   Celexa to 40 mg po q day for depression and anxiety  Wellbutrin  mg po q day for depression and nicotine cessation     Psychosocial stressors: refer to [place] [employer]/[facility] and/or temp service; patient on unemployment benefits; housing- will look into " options (family, friends, ?); patient will try to sign up for Medicaid; will refer for psychotherapy as outpatient    Dyslipidemia: lifestyle interventions; f/u with PCP        DIAGNOSTIC TESTING  Labs reviewed     Disposition:  -SW to assist with aftercare planning and collateral  -Once stable discharge home with outpatient follow up care and/or rehab  -Continue inpatient treatment under a PEC and/or CEC for danger to self as evident by depression with SI.       NEED FOR CONTINUED HOSPITALIZATION  Psychiatric illness continues to pose a potential threat to life or bodily function, of self or others, thereby requiring the need for continued inpatient psychiatric hospitalization: Yes    Protective inpatient pyschiatric hospitalization required while a safe disposition plan is enacted: Yes    Patient stabilized and ready for discharge from inpatient psychiatric unit: No      STAFF:   Raj Rubio MD  Psychiatry

## 2017-04-05 NOTE — PLAN OF CARE
Problem: Patient Care Overview (Adult)  Goal: Plan of Care Review  Outcome: Ongoing (interventions implemented as appropriate)  Pt calm and cooperative, interacts well with staff and peers, appetite good, out on unit, vitals signs stable, safety precautions maintained, will continue to monitor

## 2017-04-05 NOTE — PLAN OF CARE
Problem: Patient Care Overview (Adult)  Goal: Plan of Care Review  Outcome: Ongoing (interventions implemented as appropriate)  Pt out on unit and participating in activities.Pt reports sleeping much better last night with the air mattress.Mood improving.No suicidal ideations.Medication compliant.

## 2017-04-06 PROCEDURE — 25000003 PHARM REV CODE 250: Performed by: PSYCHIATRY & NEUROLOGY

## 2017-04-06 PROCEDURE — 11400000 HC PSYCH PRIVATE ROOM

## 2017-04-06 PROCEDURE — 25000003 PHARM REV CODE 250: Performed by: SURGERY

## 2017-04-06 PROCEDURE — 99233 SBSQ HOSP IP/OBS HIGH 50: CPT | Mod: AF,S$PBB,, | Performed by: PSYCHIATRY & NEUROLOGY

## 2017-04-06 PROCEDURE — 27000339 *HC DAILY SUPPLY KIT

## 2017-04-06 RX ORDER — BUPROPION HYDROCHLORIDE 300 MG/1
300 TABLET ORAL DAILY
Status: DISCONTINUED | OUTPATIENT
Start: 2017-04-07 | End: 2017-04-06

## 2017-04-06 RX ORDER — HYDROXYZINE PAMOATE 50 MG/1
100 CAPSULE ORAL NIGHTLY
Status: DISCONTINUED | OUTPATIENT
Start: 2017-04-06 | End: 2017-04-07 | Stop reason: HOSPADM

## 2017-04-06 RX ORDER — HYDROXYZINE PAMOATE 50 MG/1
100 CAPSULE ORAL EVERY 8 HOURS PRN
Status: DISCONTINUED | OUTPATIENT
Start: 2017-04-06 | End: 2017-04-07 | Stop reason: HOSPADM

## 2017-04-06 RX ORDER — BUPROPION HYDROCHLORIDE 150 MG/1
150 TABLET ORAL DAILY
Status: DISCONTINUED | OUTPATIENT
Start: 2017-04-07 | End: 2017-04-07 | Stop reason: HOSPADM

## 2017-04-06 RX ADMIN — THERA TABS 1 TABLET: TAB at 08:04

## 2017-04-06 RX ADMIN — BUPROPION HYDROCHLORIDE 150 MG: 150 TABLET, FILM COATED, EXTENDED RELEASE ORAL at 08:04

## 2017-04-06 RX ADMIN — NICOTINE 1 PATCH: 21 PATCH, EXTENDED RELEASE TRANSDERMAL at 08:04

## 2017-04-06 RX ADMIN — HYDROCHLOROTHIAZIDE 25 MG: 25 TABLET ORAL at 08:04

## 2017-04-06 RX ADMIN — CITALOPRAM HYDROBROMIDE 30 MG: 10 TABLET ORAL at 08:04

## 2017-04-06 RX ADMIN — HYDROXYZINE PAMOATE 100 MG: 50 CAPSULE ORAL at 08:04

## 2017-04-06 NOTE — PROGRESS NOTES
PSYCHIATRY DAILY INPATIENT PROGRESS NOTE  SUBSEQUENT HOSPITAL VISIT    ENCOUNTER DATE: 4/6/2017  SITE: [facility]    DATE OF ADMISSION: 4/2/2017  1:38 PM  LENGTH OF STAY: 4 days      HISTORY    CHIEF COMPLAINT   Selam Bird is a 51 y.o. female, seen during daily montanez rounds on the inpatient unit.  Selam Bird presents with the chief complaint of Depression and suicidal ideation    HPI   (Elements: Location, Quality, Severity, Duration, Timing, Content, Modifying Factors, Associated Signs & Symptoms)    The patient was seen and examined. The chart was reviewed.    Staff reports no behavioral or management issues.     The patient has been compliant with treatment. The patient denied any side effects.    Improving Symptoms of Depression: less diminished mood and loss of interest/anhedonia; less irritability, diminished energy, and change in sleep; denies change in appetite; no diminished concentration or cognition or indecisiveness; denies PMA/R, no excessive guilt or hopelessness or worthlessness; no suicidal ideations      Less trouble with Sleep: less trouble with initiation/maintenance, denies early morning awakening with inability to return to sleep       Denied Suicidal ideations: no active ideations; denies organized plans, no future intentions; denies homicidal ideations      Denies Symptoms of psychosis: denies hallucinations, denies delusions, denies disorganized thinking, denies disorganized behavior or abnormal motor behavior, or negative symptoms       Denies Symptoms of ry or hypomania: denies elevated, expansive, or irritable mood with increased energy or activity; with no inflated self-esteem or grandiosity, denies decreased need for sleep, denies increased rate of speech, FOI or racing thoughts, denies distractibility, increased goal directed activity or PMA, denies risky/disinhibited behavior      Improved Symptoms of LISA: less excessive anxiety/worry/fear, with no restlessness, fatigue, poor  "concentration, irritability, and muscle tension; less sleep disturbance; denies causes functionally impairing distress             ROS  General ROS: negative  Ophthalmic ROS: negative  ENT ROS: negative  Allergy and Immunology ROS: negative  Hematological and Lymphatic ROS: negative  Endocrine ROS: negative  Respiratory ROS: no cough, shortness of breath, or wheezing  Cardiovascular ROS: no chest pain or dyspnea on exertion  Gastrointestinal ROS: no abdominal pain, change in bowel habits, or black or bloody stools  Genito-Urinary ROS: no dysuria, trouble voiding, or hematuria  Musculoskeletal ROS: negative  Neurological ROS: no TIA or stroke symptoms  Dermatological ROS: negative    PAST MEDICAL HISTORY   Past Medical History:   Diagnosis Date    Hypertension            PSYCHOTROPIC MEDICATIONS   Scheduled Meds:   [START ON 4/7/2017] buPROPion  150 mg Oral Daily    citalopram  30 mg Oral Daily    hydrochlorothiazide  25 mg Oral Daily    hydrOXYzine pamoate  100 mg Oral QHS    multivitamin  1 tablet Oral Daily    nicotine  1 patch Transdermal Daily     Continuous Infusions:   PRN Meds:.acetaminophen, aluminum-magnesium hydroxide-simethicone, docusate sodium, hydrOXYzine pamoate, hydrOXYzine pamoate, loperamide, olanzapine **AND** olanzapine        EXAMINATION    VITALS   Vitals:    04/06/17 0800   BP: (!) 143/70   Pulse: (!) 55   Resp: 18   Temp: 96.6 °F (35.9 °C)       CONSTITUTIONAL  General Appearance: WF, obese, in casual attire; NAD     MUSCULOSKELETAL  Muscle Strength and Tone: normal  Abnormal Involuntary Movements: none  Gait and Station: normal; non-ataxic     PSYCHIATRIC   Level of Consciousness: awake, alert  Orientation: p/p/t/s  Grooming: adequate to circumstances  Psychomotor Behavior: no PMA/R  Speech: nl r/t/v/s  Language: English fluent  Mood: "better"  Affect: less tearful, despondent, and anxious; improving  Thought Process: linear and organized  Associations: intact; no ABBY  Thought " Content: denied AVH/delusions; denied HI, less SI  Memory: intact to recent and remote events  Attention: intact to conversation; not distractible   Fund of Knowledge: age and education appropriate  Estimate if Intelligence: average based on work/education history, vocabulary and mental status exam  Insight: good- seeks help, recognizes symptoms  Judgment: good- no bx issues, compliant and cooperative    DIAGNOSTIC TESTING   Laboratory Results  No results found for this or any previous visit (from the past 24 hour(s)).      ASSESSMENT      IMPRESSION   MDD, recurrent, severe, without psychotic features  LISA  Nicotine Dependence      Psychosocial stressors     Dyslipidemia         MEDICAL DECISION MAKING         PROBLEM LIST AND MANAGEMENT PLANS   Depression   Anxiety  Nicotine use  Psychosocial stressors   Dyslipidemia     PRESCRIPTION DRUG MANAGEMENT  Compliance: yes  Side Effects: no  Regimen Adjustments:   Celexa 30 mg po q day for depression and anxiety  Wellbutrin  mg po q day for depression and nicotine cessation     Psychosocial stressors: refer to [place] [employer]/[facility] and/or temp service; patient on unemployment benefits; housing- will look into options (family, friends, ?); patient will try to sign up for Medicaid; will refer for psychotherapy as outpatient    Dyslipidemia: lifestyle interventions; f/u with PCP        DIAGNOSTIC TESTING  Labs reviewed     Disposition:  -SW to assist with aftercare planning and collateral  -Once stable discharge home with outpatient follow up care and/or rehab  -Continue inpatient treatment under a PEC and/or CEC for danger to self as evident by depression with SI. Patient is improving and will likely be stable for discharge tomorrow.       NEED FOR CONTINUED HOSPITALIZATION  Psychiatric illness continues to pose a potential threat to life or bodily function, of self or others, thereby requiring the need for continued inpatient psychiatric hospitalization:  Yes    Protective inpatient pyschiatric hospitalization required while a safe disposition plan is enacted: Yes    Patient stabilized and ready for discharge from inpatient psychiatric unit: No      STAFF:   Raj Rubio MD  Psychiatry

## 2017-04-06 NOTE — PLAN OF CARE
"Problem: Overarching Goals (Adult)  Goal: Develops/Participates in Therapeutic [place] to Support Successful Transition  Outcome: Ongoing (interventions implemented as appropriate)  Group Note     Behavior:  Patient attended Psychotherapy Group and participated. Patient presents with improved mood and affect. Participates     Intervention:  Practicing Flexibility for Good Mental Health. Counselor shared with group a reflection on the virtue of flexibility. Discussed how focusing on negatives or positives can affect feelings, actions, and outcomes. Encouraged group members to take a belief they have and try to see it from another perspective. Reviewed ways to improve flexibility.     Response:  Patient noted it is hard to "let it go" or back down as you are perceived as weak. Patient admits she has gone from one extreme to the other and while she used to give in is more stubborn now. Patient states she can work on being more flexible and open to a different perspective.       Plan:   Will Continue to encourage patient participation in group. Will meet 1:1 with patient.       "

## 2017-04-06 NOTE — PLAN OF CARE
Problem: Patient Care Overview (Adult)  Goal: Plan of Care Review  Outcome: Ongoing (interventions implemented as appropriate)  Lying quiet in bed, eyes closed, respiration even and unlabored, appearing asleep.  Seem to have a little difficulty initiating sleep but once asleep slept well all shift with only one brief awakening.  Safety and precautions maintained with rounds every 15 minutes, bed is fixed in a low position and pathways kept clear.  No fall occurred.

## 2017-04-06 NOTE — PLAN OF CARE
Problem: Patient Care Overview (Adult)  Goal: Plan of Care Review  Outcome: Ongoing (interventions implemented as appropriate)  Pt calm and cooperative, pleasant mood, blunted affect, interacts well with staff and peers, med compliant, vital signs stable, safety precautions maintained, will continue to monitor

## 2017-04-07 VITALS
DIASTOLIC BLOOD PRESSURE: 89 MMHG | HEART RATE: 71 BPM | BODY MASS INDEX: 44.73 KG/M2 | RESPIRATION RATE: 18 BRPM | HEIGHT: 65 IN | TEMPERATURE: 97 F | SYSTOLIC BLOOD PRESSURE: 145 MMHG | WEIGHT: 268.5 LBS

## 2017-04-07 PROBLEM — F33.2 MDD (MAJOR DEPRESSIVE DISORDER), RECURRENT SEVERE, WITHOUT PSYCHOSIS: Status: ACTIVE | Noted: 2017-04-02

## 2017-04-07 PROCEDURE — 25000003 PHARM REV CODE 250: Performed by: SURGERY

## 2017-04-07 PROCEDURE — 25000003 PHARM REV CODE 250: Performed by: PSYCHIATRY & NEUROLOGY

## 2017-04-07 PROCEDURE — 99239 HOSP IP/OBS DSCHRG MGMT >30: CPT | Mod: AF,S$PBB,, | Performed by: PSYCHIATRY & NEUROLOGY

## 2017-04-07 RX ORDER — CITALOPRAM 20 MG/1
30 TABLET, FILM COATED ORAL DAILY
Qty: 45 TABLET | Refills: 1 | Status: SHIPPED | OUTPATIENT
Start: 2017-04-07 | End: 2018-05-08 | Stop reason: SDUPTHER

## 2017-04-07 RX ORDER — IBUPROFEN 200 MG
1 TABLET ORAL DAILY
Refills: 0 | COMMUNITY
Start: 2017-04-07 | End: 2018-11-29

## 2017-04-07 RX ORDER — HYDROCHLOROTHIAZIDE 12.5 MG/1
25 TABLET ORAL DAILY
Qty: 30 TABLET | Refills: 1 | Status: SHIPPED | OUTPATIENT
Start: 2017-04-07 | End: 2017-12-12

## 2017-04-07 RX ORDER — HYDROXYZINE PAMOATE 100 MG/1
100 CAPSULE ORAL NIGHTLY
Qty: 30 CAPSULE | Refills: 1 | Status: SHIPPED | OUTPATIENT
Start: 2017-04-07 | End: 2017-10-30

## 2017-04-07 RX ORDER — BUPROPION HYDROCHLORIDE 150 MG/1
150 TABLET ORAL DAILY
Qty: 30 TABLET | Refills: 1 | Status: SHIPPED | OUTPATIENT
Start: 2017-04-07 | End: 2018-05-08 | Stop reason: SDUPTHER

## 2017-04-07 RX ADMIN — BUPROPION HYDROCHLORIDE 150 MG: 150 TABLET, FILM COATED, EXTENDED RELEASE ORAL at 08:04

## 2017-04-07 RX ADMIN — NICOTINE 1 PATCH: 21 PATCH, EXTENDED RELEASE TRANSDERMAL at 08:04

## 2017-04-07 RX ADMIN — HYDROCHLOROTHIAZIDE 25 MG: 25 TABLET ORAL at 08:04

## 2017-04-07 RX ADMIN — THERA TABS 1 TABLET: TAB at 08:04

## 2017-04-07 RX ADMIN — CITALOPRAM HYDROBROMIDE 30 MG: 10 TABLET ORAL at 08:04

## 2017-04-07 NOTE — PLAN OF CARE
Problem: Patient Care Overview (Adult)  Goal: Plan of Care Review  Outcome: Ongoing (interventions implemented as appropriate)  Pt calm and cooperative, pleasant mood, interacting well with staff and peers, med compliant, vital signs stable, safety precautions maintained, will continue to monitor

## 2017-04-07 NOTE — PSYCH
Patient will be following up with [facility] at [address] [place] [address]. [address] in [place], [phone].  Appointment is on 4/13/2017 at 10 am.  Patient does not use tobacco products.  AVS and discharge summary faxed on 4/7/2017 on 2:52 pm.

## 2017-04-07 NOTE — PLAN OF CARE
Problem: Patient Care Overview (Adult)  Goal: Plan of Care Review  Outcome: Ongoing (interventions implemented as appropriate)  Lying quiet in bed, eyes closed, respiration even and unlabored, appearing asleep.  Slept well all shift.  Safety and precautions maintained with rounds every 15 minutes, bed is fixed in a low position and pathways kept clear.  No fall occurred.

## 2017-04-07 NOTE — PLAN OF CARE
Problem: Overarching Goals (Adult)  Goal: Develops/Participates in Therapeutic [place] to Support Successful Transition  Outcome: Ongoing (interventions implemented as appropriate)  Group Note      Behavior:  Patient attended Psychotherapy Group and participated. Patient presented with pleasant mood and affect.      Intervention:  Playing to your strengths - Patients answered Three Things questions about what they are good at, what makes them unique, their positive traits and challenges. Explored self esteem, recognizing strengths and weaknesses, and working to improve.       Response:  Patient noted three things she was good at were cooking, her job and making people laugh. Patient states her challenge is to believe in herself again and realizes that she is a strong person.        Plan:  Will continue to encourage patient participation in group. Patient preparing for discharge on tomorrow.

## 2017-04-07 NOTE — PROGRESS NOTES
Discharge instructions given to patient, appointments and prescriptions reviewed with patient.  Patient v/u.  Medicaid information given to patient.  Patient is awaiting ride at this time.

## 2017-04-07 NOTE — PROGRESS NOTES
Patient ride here, belongings given to patient, patient reports all her belongings are in her possession.  Patient denies any needs, prescriptions and discharge instructions given to patient.  Patient escorted off unit ambulated without difficulty

## 2017-04-07 NOTE — DISCHARGE SUMMARY
Discharge Summary  Psychiatry    Admit Date: 4/2/2017    Discharge Date and Time:  04/07/2017 12:08 PM    Attending Physician: Raj Rubio MD     Discharge Provider: Raj Rubio MD    Reason for Admission:  Depression and suicidal ideation    History of Present Illness:   The patient presented to the ER on 4/1/17 with complaints of depression and SI     The patient was medically cleared and admitted to the [facility].      Pt was brought to [facility] ER Saturday (4/1/17) by friend after pt called and expressed she needed help. Patient had written a suicide note to her parents, son, and ex- and was planning of committing suicide that day. After writing the note she went to the local bar and chatted with the  whom she's known for a while and left feeling better. She returned home and called her friend and that friend took her to the ER.   Patient states she had a depressive episode January 2016. She went to her primary care physician and was prescribed Celexa. She took Celexa for a few months before discontinuing because she was feeling better. Pt states she had another depressive episode this past January (1/2017). She had suicidal ideations at the time and had planned to OD on some pills she has at the house. Didn't commit suicide because she didn't want to put more stress on her parents. She restarted taking left over Celexa and was still taking it before coming to hospital.  She complains of multiple stressors currently. She was laid off from her work this past February (2/2017). She is living on unemployment checks but she does not get enough money so she was evicted from her home this weekend (4/2/17) and has no where to go. Her mother had a stroke 5 years ago and was left quite disabled from it, and that's putting a lot of stress on her father as sole care-taker. She is also stressed from a past/current relationship (he's been in and out of care home for the past several years,  on drugs, cheated several times).     +Symptoms of Depression: +diminished mood and loss of interest/anhedonia; ++irritability, +diminished energy, +change in sleep, denies change in appetite, +diminished concentration or cognition or indecisiveness, denies PMA/R, +excessive guilt or +hopelessness or +worthlessness, +suicidal ideations      +Trouble with Sleep: + trouble with initiation, +difficulty with maintenance, denies early morning awakening with inability to return to sleep      +Suicidal ideations: +active ideations, denies organized plans, +future intentions; denies homicidal ideations; wrote a suicide note; bequeathed her possessions      Denies Symptoms of psychosis: denies hallucinations, denies delusions, denies disorganized thinking, denies disorganized behavior or abnormal motor behavior, or negative symptoms       Denies Symptoms of ry or hypomania: denies elevated, expansive, or irritable mood with increased energy or activity; with no inflated self-esteem or grandiosity, denies decreased need for sleep, denies increased rate of speech, FOI or racing thoughts, denies distractibility, increased goal directed activity or PMA, denies risky/disinhibited behavior      +Symptoms of LISA: +excessive anxiety/worry/fear, +more days than not, +about numerous issues, +difficult to control, with +restlessness, denies fatigue, +poor concentration, +irritability, denies muscle tension, +sleep disturbance; denies causes functionally impairing distress       Denies Symptoms of Panic Disorder: denies recurrent panic attacks; without agoraphobia      Denies Symptoms of PTSD: denies h/o trauma; no re-experiencing/intrusive symptoms, avoidant behavior, negative alterations in cognition or mood, or hyperarousal symptoms; without dissociative symptoms       Denies Symptoms of OCD: denies obsessions or compulsions       Denies Symptoms of Eating Disorders: denies anorexia, bulimia or binging      Denies Substance Use:  denies intoxication, withdrawal, tolerance, used in larger amounts or duration than intended, denies unsuccessful attempts to limit or quit, denies increased time engaging in or seeking out, cravings or strong desire to use, denies failure to fulfill obligations, negative consequences in social/interpersonal/occupational,/recreational areas, use in dangerous situations, or medical or psychological consequences        Procedures Performed: * No surgery found *    Hospital Course (synopsis of major diagnoses, care, treatment, and services provided during the course of the hospital stay):   The patient was stabilized and discharged on the following medications:  Celexa 30 mg po q day for depression and anxiety  Wellbutrin  mg po q day for depression and nicotine cessation      Psychosocial stressors: refer to [place] [employer] and[facility] temp service; patient on unemployment benefits; housing- temporarily resolved- resources provided; patient signed up for Medicaid; refer for psychotherapy as outpatient     Dyslipidemia: lifestyle interventions; f/u with PCP    The patient was compliant with treatment. The patient denied any side effects.     Improved Symptoms of Depression: no diminished mood and loss of interest/anhedonia; no irritability, diminished energy, and change in sleep; denies change in appetite; no diminished concentration or cognition or indecisiveness; denies PMA/R, no excessive guilt or hopelessness or worthlessness; no suicidal ideations      Improved Sleep: no trouble with initiation/maintenance, denies early morning awakening with inability to return to sleep       Denied Suicidal ideations: no active ideations; denies organized plans, no future intentions; denies homicidal ideations      Denies Symptoms of psychosis: denies hallucinations, denies delusions, denies disorganized thinking, denies disorganized behavior or abnormal motor behavior, or negative symptoms       Denies Symptoms of  ry or hypomania: denies elevated, expansive, or irritable mood with increased energy or activity; with no inflated self-esteem or grandiosity, denies decreased need for sleep, denies increased rate of speech, FOI or racing thoughts, denies distractibility, increased goal directed activity or PMA, denies risky/disinhibited behavior      Improved Symptoms of LISA: no excessive anxiety/worry/fear, with no restlessness, fatigue, poor concentration, irritability, and muscle tension; no sleep disturbance; denies causes functionally impairing distress         Discussed diagnosis, risks and benefits of proposed treatment vs alternative treatments vs no treatment, and potential side effects of these treatments.  The patient expresses understanding of the above and displays the capacity to agree with this treatment given said understanding.  Patient also agrees that, currently, the benefits outweigh the risks and would like to pursue treatment at this time.    MSE: stated age, casually dressed, well groomed.  No psychomotor agitation or retardation.  No abnormal involuntary movements.  Gait normal.  Speech normal, conversational.  Language fluent English. Mood fine.  Affect normal range, pleasant, euthymic.  Thought process linear.  Associations intact.  Denies suicidal or homicidal ideation.  Denies auditory hallucinations, paranoid ideation, ideas of reference.  Memory intact.  Attention intact.  Fund of knowledge intact.  Insight intact.  Judgment intact.  Alert and oriented to person, place, time.      Tobacco Usage:  Is patient a smoker? Yes  Does patient want prescription for Tobacco Cessation? No  Does patient want counseling for Tobacco Cessation? No    If patient would like to quit, then over the counter nicotine patch could be used. The patient could also follow up with his PCP or psychiatric provider for other alternatives.     Final Diagnoses:    Principal Problem: MDD, recurrent, severe, without psychotic  features   Secondary Diagnoses:   LISA  Nicotine Dependence       Psychosocial stressors      Dyslipidemia     Labs:  Admission on 04/02/2017   Component Date Value Ref Range Status    TSH 04/02/2017 1.689  0.400 - 4.000 uIU/mL Final    RPR 04/02/2017 Non-reactive  Non-reactive Final    Cholesterol 04/02/2017 275* 120 - 199 mg/dL Final    Triglycerides 04/02/2017 194* 30 - 150 mg/dL Final    HDL 04/02/2017 41  40 - 75 mg/dL Final    LDL Cholesterol 04/02/2017 195.2* 63.0 - 159.0 mg/dL Final    HDL/Chol Ratio 04/02/2017 14.9* 20.0 - 50.0 % Final    Total Cholesterol/HDL Ratio 04/02/2017 6.7* 2.0 - 5.0 Final    Non-HDL Cholesterol 04/02/2017 234  mg/dL Final    Glucose 04/02/2017 104  70 - 110 mg/dL Final         Discharged Condition: stable and improved; not currently a danger to self/others or gravely disabled    Disposition: Home or Self Care    Is patient being discharged on multiple neuroleptics? No    Follow Up/Patient Instructions:     Medications:  Reconciled Home Medications:   Current Discharge Medication List      START taking these medications    Details   buPROPion (WELLBUTRIN XL) 150 MG TB24 tablet Take 1 tablet (150 mg total) by mouth once daily.  Qty: 30 tablet, Refills: 1      hydrOXYzine pamoate (VISTARIL) 100 MG capsule Take 1 capsule (100 mg total) by mouth every evening.  Qty: 30 capsule, Refills: 1      nicotine (NICODERM CQ) 21 mg/24 hr Place 1 patch onto the skin once daily.  Refills: 0         CONTINUE these medications which have CHANGED    Details   citalopram (CELEXA) 20 MG tablet Take 1.5 tablets (30 mg total) by mouth once daily.  Qty: 45 tablet, Refills: 1      hydrochlorothiazide (HYDRODIURIL) 12.5 MG Tab Take 2 tablets (25 mg total) by mouth once daily.  Qty: 30 tablet, Refills: 1         STOP taking these medications       lisinopril-hydrochlorothiazide (PRINZIDE,ZESTORETIC) 10-12.5 mg per tablet Comments:   Reason for Stopping:         meloxicam (MOBIC) 15 MG tablet  Comments:   Reason for Stopping:             No discharge procedures on file.  Follow-up Information     Follow up with [facility]    Contact information:    [facility]   [facility]   [phone]  Contact: Juan Millan         Follow up with [facility] On 4/13/2017.    Specialties:  Behavioral Health, Psychiatry, Psychology    Why:  Outpatient Psych Services, as scheduled AT 10 am     Contact information:    [address] [place] [address] [phone]              Diet: regular     Activity as tolerated    Total time spent discharging patient: 34 minutes    Raj Rubio MD  Psychiatry

## 2017-04-07 NOTE — PLAN OF CARE
Problem: Patient Care Overview (Adult)  Goal: Individualization & Mutuality  Outcome: Ongoing (interventions implemented as appropriate)  In the dayroom sitting at a table talking with peers. Depressed mood. Sad affect. Improved hygiene and grooming. Denies SI or HI or plans. Calm and cooperative. Eating meals and accepting medications. Attends and participates in groups. Will continue to monitor.

## 2017-10-30 ENCOUNTER — OFFICE VISIT (OUTPATIENT)
Dept: FAMILY MEDICINE | Facility: CLINIC | Age: 52
End: 2017-10-30
Payer: COMMERCIAL

## 2017-10-30 VITALS
DIASTOLIC BLOOD PRESSURE: 83 MMHG | OXYGEN SATURATION: 98 % | BODY MASS INDEX: 40.74 KG/M2 | TEMPERATURE: 98 F | HEART RATE: 62 BPM | WEIGHT: 244.81 LBS | RESPIRATION RATE: 15 BRPM | SYSTOLIC BLOOD PRESSURE: 141 MMHG

## 2017-10-30 DIAGNOSIS — J30.89 ACUTE NONSEASONAL ALLERGIC RHINITIS DUE TO OTHER ALLERGEN: Primary | ICD-10-CM

## 2017-10-30 DIAGNOSIS — J06.9 UPPER RESPIRATORY TRACT INFECTION, UNSPECIFIED TYPE: ICD-10-CM

## 2017-10-30 PROCEDURE — 96372 THER/PROPH/DIAG INJ SC/IM: CPT | Mod: S$GLB,,, | Performed by: FAMILY MEDICINE

## 2017-10-30 PROCEDURE — 99203 OFFICE O/P NEW LOW 30 MIN: CPT | Mod: S$GLB,,, | Performed by: NURSE PRACTITIONER

## 2017-10-30 RX ORDER — LORATADINE 10 MG/1
10 TABLET ORAL DAILY
Qty: 30 TABLET | Refills: 0 | Status: SHIPPED | OUTPATIENT
Start: 2017-10-30 | End: 2018-01-02 | Stop reason: SDUPTHER

## 2017-10-30 RX ORDER — TRIAMCINOLONE ACETONIDE 40 MG/ML
80 INJECTION, SUSPENSION INTRA-ARTICULAR; INTRAMUSCULAR
Status: COMPLETED | OUTPATIENT
Start: 2017-10-30 | End: 2017-10-30

## 2017-10-30 RX ORDER — LORATADINE 10 MG/1
10 TABLET ORAL DAILY
Qty: 30 TABLET | Refills: 0 | COMMUNITY
Start: 2017-10-30 | End: 2017-10-30 | Stop reason: SDUPTHER

## 2017-10-30 RX ADMIN — TRIAMCINOLONE ACETONIDE 80 MG: 40 INJECTION, SUSPENSION INTRA-ARTICULAR; INTRAMUSCULAR at 02:10

## 2017-10-30 NOTE — PROGRESS NOTES
Subjective:       Patient ID: Selam Bird is a 51 y.o. female.    Chief Complaint: Sinus Problem    Sinus Problem   This is a new problem. The current episode started in the past 7 days. The problem is unchanged. There has been no fever. She is experiencing no pain. Associated symptoms include congestion, coughing and a sore throat. Pertinent negatives include no chills, diaphoresis, ear pain, headaches, hoarse voice, neck pain, shortness of breath, sinus pressure, sneezing or swollen glands. Past treatments include nothing.     Review of Systems   Constitutional: Positive for fatigue. Negative for chills, diaphoresis and fever.   HENT: Positive for congestion, sore throat and voice change. Negative for ear pain, hoarse voice, postnasal drip, rhinorrhea, sinus pressure and sneezing.    Eyes: Negative for photophobia and visual disturbance.   Respiratory: Positive for cough. Negative for chest tightness and shortness of breath.    Cardiovascular: Negative for chest pain, palpitations and leg swelling.   Musculoskeletal: Negative for neck pain.   Neurological: Negative for headaches.       Objective:      Physical Exam   Constitutional: She is oriented to person, place, and time. She appears well-developed and well-nourished. No distress.   HENT:   Right Ear: Tympanic membrane and external ear normal.   Left Ear: Tympanic membrane and external ear normal.   Nose: Mucosal edema and rhinorrhea present. Right sinus exhibits no maxillary sinus tenderness and no frontal sinus tenderness. Left sinus exhibits no maxillary sinus tenderness and no frontal sinus tenderness.   Mouth/Throat: Posterior oropharyngeal erythema present. No oropharyngeal exudate or posterior oropharyngeal edema.   Cardiovascular: Normal rate, regular rhythm and normal heart sounds.    Pulmonary/Chest: Effort normal and breath sounds normal.   Neurological: She is alert and oriented to person, place, and time.   Skin: Skin is warm and dry. She is not  diaphoretic.   Psychiatric: She has a normal mood and affect. Her behavior is normal. Judgment and thought content normal.   Vitals reviewed.      Assessment:       1. Acute nonseasonal allergic rhinitis due to other allergen    2. Upper respiratory tract infection, unspecified type        Plan:       Acute nonseasonal allergic rhinitis due to other allergen  -     triamcinolone acetonide injection 80 mg; Inject 2 mLs (80 mg total) into the muscle one time.  -     loratadine (CLARITIN) 10 mg tablet; Take 1 tablet (10 mg total) by mouth once daily.  Dispense: 30 tablet; Refill: 0    Upper respiratory tract infection, unspecified type  -     loratadine (CLARITIN) 10 mg tablet; Take 1 tablet (10 mg total) by mouth once daily.  Dispense: 30 tablet; Refill: 0

## 2017-12-12 ENCOUNTER — OFFICE VISIT (OUTPATIENT)
Dept: FAMILY MEDICINE | Facility: CLINIC | Age: 52
End: 2017-12-12
Payer: COMMERCIAL

## 2017-12-12 VITALS
DIASTOLIC BLOOD PRESSURE: 80 MMHG | RESPIRATION RATE: 15 BRPM | HEART RATE: 65 BPM | SYSTOLIC BLOOD PRESSURE: 142 MMHG | OXYGEN SATURATION: 98 % | BODY MASS INDEX: 39.95 KG/M2 | WEIGHT: 240.13 LBS | TEMPERATURE: 99 F

## 2017-12-12 DIAGNOSIS — Z00.00 ANNUAL PHYSICAL EXAM: Primary | ICD-10-CM

## 2017-12-12 DIAGNOSIS — I10 ESSENTIAL HYPERTENSION: ICD-10-CM

## 2017-12-12 DIAGNOSIS — Z12.39 SCREENING FOR BREAST CANCER: ICD-10-CM

## 2017-12-12 PROCEDURE — 99396 PREV VISIT EST AGE 40-64: CPT | Mod: S$GLB,,, | Performed by: NURSE PRACTITIONER

## 2017-12-12 RX ORDER — HYDROCHLOROTHIAZIDE 25 MG/1
25 TABLET ORAL DAILY
Qty: 30 TABLET | Refills: 11 | Status: SHIPPED | OUTPATIENT
Start: 2017-12-12 | End: 2018-11-29 | Stop reason: SDUPTHER

## 2017-12-12 NOTE — PROGRESS NOTES
Subjective:       Patient ID: Selam Bird is a 52 y.o. female.    Chief Complaint: Hypertension    Hypertension   This is a chronic problem. The current episode started more than 1 month ago (need a refll on HCTZ she has two left). The problem is unchanged. The problem is uncontrolled. Pertinent negatives include no anxiety, blurred vision, chest pain, headaches, malaise/fatigue, neck pain, orthopnea, palpitations, peripheral edema, PND, shortness of breath or sweats. There are no associated agents to hypertension. Risk factors for coronary artery disease include sedentary lifestyle and obesity. Treatments tried: HCTZ. There is no history of angina, kidney disease, CAD/MI, CVA, heart failure, left ventricular hypertrophy or retinopathy.     Review of Systems   Constitutional: Negative for malaise/fatigue.   Eyes: Negative for blurred vision.   Respiratory: Negative for shortness of breath.    Cardiovascular: Negative for chest pain, palpitations, orthopnea and PND.   Musculoskeletal: Negative for neck pain.   Neurological: Negative for headaches.       Objective:      Physical Exam   Constitutional: She is oriented to person, place, and time. She appears well-developed and well-nourished. No distress.   HENT:   Right Ear: External ear normal.   Left Ear: External ear normal.   Cardiovascular: Normal rate, regular rhythm and normal heart sounds.    No murmur heard.  Pulmonary/Chest: Effort normal and breath sounds normal. No respiratory distress. She has no wheezes.   Neurological: She is alert and oriented to person, place, and time.   Skin: Skin is warm and dry. No rash noted. She is not diaphoretic. No erythema. No pallor.   Psychiatric: She has a normal mood and affect. Her behavior is normal. Judgment and thought content normal.   Vitals reviewed.          1. Annual physical exam    2. Essential hypertension    3. Screening for breast cancer        Plan:       Annual physical exam  -     Lipid panel; Future  -      TSH; Future  -     CBC auto differential; Future  -     Comprehensive metabolic panel; Future  -     Urinalysis; Future  -     Mammo Digital Screening Bilat with Reji without CAD; Future  -     Hepatitis C antibody; Future    Essential hypertension  -     Lipid panel; Future  -     CBC auto differential; Future  -     Comprehensive metabolic panel; Future  -     Urinalysis; Future    Screening for breast cancer  -     Mammo Digital Screening Bilat with Reji without CAD; Future    Other orders  -     hydroCHLOROthiazide (HYDRODIURIL) 25 MG tablet; Take 1 tablet (25 mg total) by mouth once daily.  Dispense: 30 tablet; Refill: 11        Pt declines colon states she never had one and doesn't want one  Declines flu and tetanus shots  Number provided for her to call and schedule mammo

## 2018-01-02 DIAGNOSIS — J06.9 UPPER RESPIRATORY TRACT INFECTION, UNSPECIFIED TYPE: ICD-10-CM

## 2018-01-02 DIAGNOSIS — J30.89 ACUTE NONSEASONAL ALLERGIC RHINITIS DUE TO OTHER ALLERGEN: ICD-10-CM

## 2018-01-03 RX ORDER — LORATADINE 10 MG/1
TABLET ORAL
Qty: 30 TABLET | Refills: 0 | Status: SHIPPED | OUTPATIENT
Start: 2018-01-03 | End: 2018-11-29

## 2018-05-08 ENCOUNTER — TELEPHONE (OUTPATIENT)
Dept: FAMILY MEDICINE | Facility: CLINIC | Age: 53
End: 2018-05-08

## 2018-05-08 RX ORDER — CITALOPRAM 20 MG/1
30 TABLET, FILM COATED ORAL DAILY
Qty: 45 TABLET | Refills: 1 | Status: SHIPPED | OUTPATIENT
Start: 2018-05-08 | End: 2018-06-28 | Stop reason: SDUPTHER

## 2018-05-08 RX ORDER — BUPROPION HYDROCHLORIDE 150 MG/1
150 TABLET ORAL DAILY
Qty: 30 TABLET | Refills: 1 | Status: SHIPPED | OUTPATIENT
Start: 2018-05-08 | End: 2018-05-08 | Stop reason: SDUPTHER

## 2018-05-08 RX ORDER — BUPROPION HYDROCHLORIDE 150 MG/1
150 TABLET ORAL DAILY
Qty: 30 TABLET | Refills: 1 | Status: SHIPPED | OUTPATIENT
Start: 2018-05-08 | End: 2018-06-28 | Stop reason: SDUPTHER

## 2018-05-08 RX ORDER — CITALOPRAM 20 MG/1
30 TABLET, FILM COATED ORAL DAILY
Qty: 45 TABLET | Refills: 1 | Status: SHIPPED | OUTPATIENT
Start: 2018-05-08 | End: 2018-05-08 | Stop reason: SDUPTHER

## 2018-05-08 NOTE — TELEPHONE ENCOUNTER
----- Message from Chelsie Schaefer sent at 5/7/2018  4:16 PM CDT -----  Contact: Self / [phone]  Patient is requesting a medication refill.     RX name: buPROPion  Strength: 150 MG TB24 tablet  Quantity: 30 pills  Directions: Take 1 tablet (150 mg total) by mouth once daily    RX name: citalopram  Strength: 20 mg  Quantity: 45 tablets  Directions: Take 1.5 tablets (30 mg total) by mouth once daily    Pharmacy name: [facility]      Phone number where patient can be reached: [phone]

## 2018-06-28 ENCOUNTER — OFFICE VISIT (OUTPATIENT)
Dept: FAMILY MEDICINE | Facility: CLINIC | Age: 53
End: 2018-06-28
Payer: COMMERCIAL

## 2018-06-28 ENCOUNTER — HOSPITAL ENCOUNTER (OUTPATIENT)
Dept: RADIOLOGY | Facility: HOSPITAL | Age: 53
Discharge: HOME | End: 2018-06-28
Attending: NURSE PRACTITIONER
Payer: COMMERCIAL

## 2018-06-28 VITALS
HEIGHT: 65 IN | BODY MASS INDEX: 38.76 KG/M2 | HEART RATE: 67 BPM | OXYGEN SATURATION: 98 % | DIASTOLIC BLOOD PRESSURE: 78 MMHG | WEIGHT: 232.63 LBS | SYSTOLIC BLOOD PRESSURE: 118 MMHG | TEMPERATURE: 98 F

## 2018-06-28 DIAGNOSIS — M25.511 ACUTE PAIN OF RIGHT SHOULDER: Primary | ICD-10-CM

## 2018-06-28 DIAGNOSIS — M25.511 ACUTE PAIN OF RIGHT SHOULDER: ICD-10-CM

## 2018-06-28 PROCEDURE — 99213 OFFICE O/P EST LOW 20 MIN: CPT | Mod: S$GLB,,, | Performed by: NURSE PRACTITIONER

## 2018-06-28 PROCEDURE — 3008F BODY MASS INDEX DOCD: CPT | Mod: CPTII,S$GLB,, | Performed by: NURSE PRACTITIONER

## 2018-06-28 PROCEDURE — 3074F SYST BP LT 130 MM HG: CPT | Mod: CPTII,S$GLB,, | Performed by: NURSE PRACTITIONER

## 2018-06-28 PROCEDURE — 3078F DIAST BP <80 MM HG: CPT | Mod: CPTII,S$GLB,, | Performed by: NURSE PRACTITIONER

## 2018-06-28 PROCEDURE — 73030 X-RAY EXAM OF SHOULDER: CPT | Mod: TC,FY,PO,RT

## 2018-06-28 RX ORDER — CITALOPRAM 20 MG/1
30 TABLET, FILM COATED ORAL DAILY
Qty: 45 TABLET | Refills: 1 | Status: SHIPPED | OUTPATIENT
Start: 2018-06-28 | End: 2018-09-05 | Stop reason: SDUPTHER

## 2018-06-28 RX ORDER — BUPROPION HYDROCHLORIDE 150 MG/1
150 TABLET ORAL DAILY
Qty: 30 TABLET | Refills: 1 | Status: SHIPPED | OUTPATIENT
Start: 2018-06-28 | End: 2018-09-05 | Stop reason: SDUPTHER

## 2018-06-28 NOTE — PROGRESS NOTES
Subjective:       Patient ID: Selam Bird is a 52 y.o. female.    Chief Complaint: Shoulder Pain (right pain)    Shoulder Pain    Pain location: right shoulder. This is a new problem. The current episode started 1 to 4 weeks ago. The problem occurs intermittently. The problem has been unchanged. The quality of the pain is described as aching. The pain is at a severity of 2/10. The pain is mild. Pertinent negatives include no fever, headaches, inability to bear weight, itching, joint locking, joint swelling, limited range of motion, numbness, stiffness, tingling or visual symptoms. Exacerbated by: states she will just feel it sitting there. She has tried nothing for the symptoms. There is no history of diabetes, Injuries to Extremity or migraines.     Review of Systems   Constitutional: Negative for chills, diaphoresis, fatigue and fever.   Eyes: Negative for photophobia and visual disturbance.   Respiratory: Negative for cough, chest tightness, shortness of breath and wheezing.    Cardiovascular: Negative for chest pain, palpitations and leg swelling.   Musculoskeletal: Negative for joint swelling, neck pain and stiffness.        Right shoulder pain     Skin: Negative for color change, itching, pallor, rash and wound.   Neurological: Negative for tingling, numbness and headaches.       Objective:      Physical Exam   Constitutional: She is oriented to person, place, and time. She appears well-developed and well-nourished. No distress.   HENT:   Right Ear: External ear normal.   Left Ear: External ear normal.   Cardiovascular: Normal rate, regular rhythm and normal heart sounds.    Pulmonary/Chest: Effort normal and breath sounds normal. No respiratory distress. She has no wheezes.   Musculoskeletal: She exhibits tenderness. She exhibits no edema or deformity.        Arms:  Neurological: She is alert and oriented to person, place, and time.   Skin: Skin is warm and dry. She is not diaphoretic.   Psychiatric: She has  a normal mood and affect. Her behavior is normal. Judgment and thought content normal.   Vitals reviewed.      Assessment:       1. Acute pain of right shoulder        Plan:       Acute pain of right shoulder  -     X-ray Shoulder 2 or More Views Right; Future    Other orders  -     buPROPion (WELLBUTRIN XL) 150 MG TB24 tablet; Take 1 tablet (150 mg total) by mouth once daily.  Dispense: 30 tablet; Refill: 1  -     citalopram (CELEXA) 20 MG tablet; Take 1.5 tablets (30 mg total) by mouth once daily.  Dispense: 45 tablet; Refill: 1

## 2018-06-29 ENCOUNTER — TELEPHONE (OUTPATIENT)
Dept: FAMILY MEDICINE | Facility: CLINIC | Age: 53
End: 2018-06-29

## 2018-06-29 RX ORDER — IBUPROFEN 800 MG/1
800 TABLET ORAL 3 TIMES DAILY PRN
Qty: 30 TABLET | Refills: 0 | Status: SHIPPED | OUTPATIENT
Start: 2018-06-29 | End: 2019-01-09

## 2018-06-29 NOTE — TELEPHONE ENCOUNTER
Spoke with patient let her know xray was normal recommend antiinflammatories and alternate heat and ice if no improvement let me know

## 2018-09-05 RX ORDER — BUPROPION HYDROCHLORIDE 150 MG/1
TABLET ORAL
Qty: 30 TABLET | Refills: 0 | Status: SHIPPED | OUTPATIENT
Start: 2018-09-05 | End: 2018-10-05 | Stop reason: SDUPTHER

## 2018-09-05 RX ORDER — CITALOPRAM 20 MG/1
TABLET, FILM COATED ORAL
Qty: 45 TABLET | Refills: 0 | Status: SHIPPED | OUTPATIENT
Start: 2018-09-05 | End: 2018-10-05 | Stop reason: SDUPTHER

## 2018-10-05 RX ORDER — BUPROPION HYDROCHLORIDE 150 MG/1
TABLET ORAL
Qty: 30 TABLET | Refills: 0 | Status: SHIPPED | OUTPATIENT
Start: 2018-10-05 | End: 2018-11-07 | Stop reason: SDUPTHER

## 2018-10-05 RX ORDER — CITALOPRAM 20 MG/1
TABLET, FILM COATED ORAL
Qty: 45 TABLET | Refills: 0 | Status: SHIPPED | OUTPATIENT
Start: 2018-10-05 | End: 2018-11-07 | Stop reason: SDUPTHER

## 2018-11-07 RX ORDER — CITALOPRAM 20 MG/1
20 TABLET, FILM COATED ORAL DAILY
Qty: 45 TABLET | Refills: 0 | Status: SHIPPED | OUTPATIENT
Start: 2018-11-07 | End: 2018-11-29 | Stop reason: SDUPTHER

## 2018-11-07 RX ORDER — BUPROPION HYDROCHLORIDE 150 MG/1
150 TABLET ORAL DAILY
Qty: 30 TABLET | Refills: 0 | Status: SHIPPED | OUTPATIENT
Start: 2018-11-07 | End: 2018-11-29 | Stop reason: SDUPTHER

## 2018-11-07 NOTE — TELEPHONE ENCOUNTER
Patient advised [facility] is no longer here and the request has been sent to Dr. Fragoso for a 30 day supply but a appointment is needed to establish care with one of the providers here before next refills are due. Patient didn't want to schedule appointment at this time last OV with [facility] on 06/28/2018      ----- Message from Cammie Clarke sent at 11/7/2018 12:37 PM CST -----  Contact: Self/ [phone]  Patient used to see NP Mimi. She called to ask if you received a prescription refill request from [facility].    Please call.     citalopram (CELEXA) 20 MG tablet  Bupropion 150 MG

## 2018-11-29 ENCOUNTER — OFFICE VISIT (OUTPATIENT)
Dept: FAMILY MEDICINE | Facility: CLINIC | Age: 53
End: 2018-11-29
Payer: COMMERCIAL

## 2018-11-29 VITALS
WEIGHT: 243.5 LBS | BODY MASS INDEX: 40.57 KG/M2 | TEMPERATURE: 98 F | HEIGHT: 65 IN | SYSTOLIC BLOOD PRESSURE: 140 MMHG | OXYGEN SATURATION: 98 % | DIASTOLIC BLOOD PRESSURE: 80 MMHG | HEART RATE: 62 BPM

## 2018-11-29 DIAGNOSIS — Z11.59 ENCOUNTER FOR HEPATITIS C SCREENING TEST FOR LOW RISK PATIENT: ICD-10-CM

## 2018-11-29 DIAGNOSIS — Z23 NEED FOR INFLUENZA VACCINATION: ICD-10-CM

## 2018-11-29 DIAGNOSIS — F33.2 MDD (MAJOR DEPRESSIVE DISORDER), RECURRENT SEVERE, WITHOUT PSYCHOSIS: ICD-10-CM

## 2018-11-29 DIAGNOSIS — Z23 NEED FOR PNEUMOCOCCAL VACCINATION: ICD-10-CM

## 2018-11-29 DIAGNOSIS — M19.90 OSTEOARTHRITIS, UNSPECIFIED OSTEOARTHRITIS TYPE, UNSPECIFIED SITE: ICD-10-CM

## 2018-11-29 DIAGNOSIS — I10 ESSENTIAL HYPERTENSION: Primary | ICD-10-CM

## 2018-11-29 DIAGNOSIS — Z12.39 BREAST CANCER SCREENING: ICD-10-CM

## 2018-11-29 PROCEDURE — 90686 IIV4 VACC NO PRSV 0.5 ML IM: CPT | Mod: S$GLB,,, | Performed by: FAMILY MEDICINE

## 2018-11-29 PROCEDURE — 3008F BODY MASS INDEX DOCD: CPT | Mod: CPTII,S$GLB,, | Performed by: FAMILY MEDICINE

## 2018-11-29 PROCEDURE — 99214 OFFICE O/P EST MOD 30 MIN: CPT | Mod: 25,S$GLB,, | Performed by: FAMILY MEDICINE

## 2018-11-29 PROCEDURE — 90472 IMMUNIZATION ADMIN EACH ADD: CPT | Mod: S$GLB,,, | Performed by: FAMILY MEDICINE

## 2018-11-29 PROCEDURE — 90732 PPSV23 VACC 2 YRS+ SUBQ/IM: CPT | Mod: S$GLB,,, | Performed by: FAMILY MEDICINE

## 2018-11-29 PROCEDURE — 3079F DIAST BP 80-89 MM HG: CPT | Mod: CPTII,S$GLB,, | Performed by: FAMILY MEDICINE

## 2018-11-29 PROCEDURE — 3077F SYST BP >= 140 MM HG: CPT | Mod: CPTII,S$GLB,, | Performed by: FAMILY MEDICINE

## 2018-11-29 PROCEDURE — 90471 IMMUNIZATION ADMIN: CPT | Mod: S$GLB,,, | Performed by: FAMILY MEDICINE

## 2018-11-29 RX ORDER — CITALOPRAM 20 MG/1
TABLET, FILM COATED ORAL
Qty: 45 TABLET | Refills: 11 | Status: SHIPPED | OUTPATIENT
Start: 2018-11-29 | End: 2019-03-20

## 2018-11-29 RX ORDER — HYDROCHLOROTHIAZIDE 25 MG/1
25 TABLET ORAL DAILY
Qty: 30 TABLET | Refills: 11 | Status: SHIPPED | OUTPATIENT
Start: 2018-11-29 | End: 2019-12-17 | Stop reason: SDUPTHER

## 2018-11-29 RX ORDER — MELOXICAM 15 MG/1
15 TABLET ORAL DAILY
Qty: 30 TABLET | Refills: 11 | Status: SHIPPED | OUTPATIENT
Start: 2018-11-29 | End: 2019-01-09

## 2018-11-29 RX ORDER — BUPROPION HYDROCHLORIDE 150 MG/1
150 TABLET ORAL DAILY
Qty: 30 TABLET | Refills: 11 | Status: SHIPPED | OUTPATIENT
Start: 2018-11-29 | End: 2019-12-17 | Stop reason: SDUPTHER

## 2018-11-29 NOTE — PROGRESS NOTES
Subjective:       Patient ID: Selam Bird is a 52 y.o. female.    Chief Complaint:   Chief Complaint   Patient presents with    Medication Refill       History of depression:  Was hospitalized in 2016 for suicidal ideations at that time she was put on wellbutrin and celexa.  She has been on these since then.  No Suicidal thoughts.  Mood is good.  Tolerates her medication well.      Hypertension   This is a chronic problem. The current episode started more than 1 month ago (need a refll on HCTZ she has two left). The problem is unchanged. The problem is uncontrolled. Pertinent negatives include no anxiety, blurred vision, chest pain, headaches, malaise/fatigue, neck pain, orthopnea, palpitations, peripheral edema, PND, shortness of breath or sweats. There are no associated agents to hypertension. Risk factors for coronary artery disease include sedentary lifestyle and obesity. Treatments tried: HCTZ. There is no history of angina, kidney disease, CAD/MI, CVA, heart failure, left ventricular hypertrophy or retinopathy.   Shoulder Pain    The pain is present in the right shoulder. This is a chronic problem. The current episode started more than 1 year ago. There has been no history of extremity trauma. The problem occurs intermittently. The problem has been unchanged. The quality of the pain is described as aching and burning. The pain is moderate. Associated symptoms include a limited range of motion and stiffness. Pertinent negatives include no fever, headaches, inability to bear weight, itching, joint locking, joint swelling, numbness, tingling or visual symptoms. The symptoms are aggravated by cold. She has tried NSAIDS for the symptoms. Family history includes arthritis. There is no history of diabetes, Injuries to Extremity or migraines.     Review of Systems   Constitutional: Negative for activity change, appetite change, chills, fatigue, fever and malaise/fatigue.   HENT: Negative for congestion, ear  discharge, ear pain, rhinorrhea, sinus pain, sore throat and trouble swallowing.    Eyes: Negative for blurred vision, photophobia, pain, redness, itching and visual disturbance.   Respiratory: Negative for cough, chest tightness, shortness of breath and wheezing.    Cardiovascular: Negative for chest pain, palpitations, orthopnea, leg swelling and PND.   Gastrointestinal: Negative for abdominal distention, abdominal pain, blood in stool, diarrhea, nausea and vomiting.   Genitourinary: Negative for dysuria, pelvic pain, vaginal bleeding, vaginal discharge and vaginal pain.   Musculoskeletal: Positive for stiffness. Negative for arthralgias, back pain, gait problem and neck pain.   Skin: Negative for color change, itching, pallor and rash.   Neurological: Negative for dizziness, tingling, tremors, weakness, light-headedness, numbness and headaches.   Psychiatric/Behavioral: Negative for agitation, behavioral problems, confusion and sleep disturbance.       Past Medical History:   Diagnosis Date    Depression     HTN (hypertension)     Hypertension      Social History     Socioeconomic History    Marital status:      Spouse name: Not on file    Number of children: Not on file    Years of education: Not on file    Highest education level: Not on file   Social Needs    Financial resource strain: Not on file    Food insecurity - worry: Not on file    Food insecurity - inability: Not on file    Transportation needs - medical: Not on file    Transportation needs - non-medical: Not on file   Occupational History    Not on file   Tobacco Use    Smoking status: Current Every Day Smoker     Packs/day: 1.00   Substance and Sexual Activity    Alcohol use: Yes     Comment: seldom    Drug use: Not on file    Sexual activity: Not on file   Other Topics Concern    Patient feels they ought to cut down on drinking/drug use Not Asked    Patient annoyed by others criticizing their drinking/drug use Not Asked  "   Patient has felt bad or guilty about drinking/drug use Not Asked    Patient has had a drink/used drugs as an eye opener in the AM Not Asked   Social History Narrative    Not on file       Objective:     BP (!) 140/80 (BP Location: Left arm, Patient Position: Sitting)   Pulse 62   Temp 98.2 °F (36.8 °C) (Oral)   Ht 5' 5" (1.651 m)   Wt 110.5 kg (243 lb 8 oz)   SpO2 98%   BMI 40.52 kg/m²     Physical Exam   Constitutional: She is oriented to person, place, and time. She appears well-developed and well-nourished. No distress.   HENT:   Head: Normocephalic and atraumatic.   Right Ear: External ear normal.   Left Ear: External ear normal.   Mouth/Throat: Oropharynx is clear and moist. No oropharyngeal exudate.   Eyes: Conjunctivae and EOM are normal. Pupils are equal, round, and reactive to light. Right eye exhibits no discharge. Left eye exhibits no discharge.   Neck: Normal range of motion. Neck supple. No tracheal deviation present. No thyromegaly present.   Cardiovascular: Normal rate, regular rhythm, normal heart sounds and intact distal pulses. Exam reveals no gallop and no friction rub.   No murmur heard.  Pulmonary/Chest: Effort normal and breath sounds normal. No respiratory distress. She has no wheezes. She has no rales. She exhibits no tenderness.   Abdominal: Soft. Bowel sounds are normal. She exhibits no distension. There is no tenderness. There is no guarding.   Musculoskeletal: She exhibits no edema or deformity.        Right shoulder: She exhibits tenderness. She exhibits normal range of motion, no swelling and no effusion.   Neurological: She is alert and oriented to person, place, and time. She displays normal reflexes. No cranial nerve deficit. She exhibits normal muscle tone. Coordination normal.   Skin: Skin is warm and dry. Capillary refill takes less than 2 seconds. No rash noted. She is not diaphoretic. No erythema. No pallor.   Psychiatric: She has a normal mood and affect. Her " behavior is normal. Judgment and thought content normal.   Vitals reviewed.          Essential hypertension  -     hydroCHLOROthiazide (HYDRODIURIL) 25 MG tablet; Take 1 tablet (25 mg total) by mouth once daily.  Dispense: 30 tablet; Refill: 11  -     Basic metabolic panel; Future; Expected date: 11/29/2018  -     CBC auto differential; Future; Expected date: 11/29/2018  -     Lipid panel; Future; Expected date: 11/29/2018    MDD (major depressive disorder), recurrent severe, without psychosis  -     buPROPion (WELLBUTRIN XL) 150 MG TB24 tablet; Take 1 tablet (150 mg total) by mouth once daily.  Dispense: 30 tablet; Refill: 11  -     citalopram (CELEXA) 20 MG tablet; Take 1.5 tablets daily  Dispense: 45 tablet; Refill: 11    Encounter for hepatitis C screening test for low risk patient  -     Hepatitis C antibody; Future; Expected date: 11/29/2018    Osteoarthritis, unspecified osteoarthritis type, unspecified site  -     meloxicam (MOBIC) 15 MG tablet; Take 1 tablet (15 mg total) by mouth once daily.  Dispense: 30 tablet; Refill: 11    Breast cancer screening  -     Mammo Digital Diagnostic Bilateral; Future; Expected date: 11/29/2018    Need for influenza vaccination  -     Influenza - Quadrivalent (3 years & older) (PF)

## 2019-01-09 ENCOUNTER — HOSPITAL ENCOUNTER (EMERGENCY)
Facility: HOSPITAL | Age: 54
Discharge: HOME | End: 2019-01-09
Attending: EMERGENCY MEDICINE
Payer: COMMERCIAL

## 2019-01-09 VITALS
HEART RATE: 66 BPM | HEIGHT: 65 IN | BODY MASS INDEX: 39.99 KG/M2 | TEMPERATURE: 98 F | DIASTOLIC BLOOD PRESSURE: 82 MMHG | RESPIRATION RATE: 18 BRPM | OXYGEN SATURATION: 100 % | SYSTOLIC BLOOD PRESSURE: 143 MMHG | WEIGHT: 240 LBS

## 2019-01-09 DIAGNOSIS — S39.012A LUMBAR STRAIN, INITIAL ENCOUNTER: Primary | ICD-10-CM

## 2019-01-09 LAB
BACTERIA #/AREA URNS AUTO: ABNORMAL /HPF
BILIRUB UR QL STRIP: NEGATIVE
CLARITY UR REFRACT.AUTO: CLEAR
COLOR UR AUTO: YELLOW
GLUCOSE UR QL STRIP: NEGATIVE
HGB UR QL STRIP: ABNORMAL
HYALINE CASTS UR QL AUTO: 1 /LPF
KETONES UR QL STRIP: NEGATIVE
LEUKOCYTE ESTERASE UR QL STRIP: ABNORMAL
MICROSCOPIC COMMENT: ABNORMAL
NITRITE UR QL STRIP: NEGATIVE
PH UR STRIP: 6 [PH] (ref 5–8)
PROT UR QL STRIP: NEGATIVE
RBC #/AREA URNS AUTO: 10 /HPF (ref 0–4)
SP GR UR STRIP: 1.02 (ref 1–1.03)
SQUAMOUS #/AREA URNS AUTO: ABNORMAL /HPF
URN SPEC COLLECT METH UR: ABNORMAL
UROBILINOGEN UR STRIP-ACNC: NEGATIVE EU/DL
WBC #/AREA URNS AUTO: 5 /HPF (ref 0–5)

## 2019-01-09 PROCEDURE — 96372 THER/PROPH/DIAG INJ SC/IM: CPT | Mod: ER

## 2019-01-09 PROCEDURE — 99284 EMERGENCY DEPT VISIT MOD MDM: CPT | Mod: 25,ER

## 2019-01-09 PROCEDURE — 81000 URINALYSIS NONAUTO W/SCOPE: CPT | Mod: ER

## 2019-01-09 PROCEDURE — 63600175 PHARM REV CODE 636 W HCPCS: Mod: ER | Performed by: EMERGENCY MEDICINE

## 2019-01-09 RX ORDER — KETOROLAC TROMETHAMINE 10 MG/1
10 TABLET, FILM COATED ORAL EVERY 8 HOURS
Qty: 15 TABLET | Refills: 0 | Status: SHIPPED | OUTPATIENT
Start: 2019-01-09 | End: 2019-01-15

## 2019-01-09 RX ORDER — METHOCARBAMOL 500 MG/1
1000 TABLET, FILM COATED ORAL 3 TIMES DAILY
Qty: 30 TABLET | Refills: 0 | Status: SHIPPED | OUTPATIENT
Start: 2019-01-09 | End: 2019-01-15

## 2019-01-09 RX ORDER — KETOROLAC TROMETHAMINE 30 MG/ML
60 INJECTION, SOLUTION INTRAMUSCULAR; INTRAVENOUS
Status: COMPLETED | OUTPATIENT
Start: 2019-01-09 | End: 2019-01-09

## 2019-01-09 RX ADMIN — KETOROLAC TROMETHAMINE 60 MG: 30 INJECTION, SOLUTION INTRAMUSCULAR at 02:01

## 2019-01-09 NOTE — ED PROVIDER NOTES
Encounter Date: 1/9/2019       History     Chief Complaint   Patient presents with    Back Pain     Pt with c/o R lower back that started Sunday and began hurting again tonight. Pt denies injury or dysuria. Pt denies any other complaints.     The history is provided by the patient.   Back Pain    This is a new problem. The current episode started several days ago. The problem occurs constantly. The problem has been unchanged. The pain is associated with no known injury. The pain is present in the lumbar spine. The quality of the pain is described as aching. The pain does not radiate. Risk factors include obesity, lack of exercise, poor posture and a sedentary lifestyle.     Review of patient's allergies indicates:  No Known Allergies  Past Medical History:   Diagnosis Date    Depression     HTN (hypertension)     Hypertension      Past Surgical History:   Procedure Laterality Date    GALLBLADDER SURGERY      HYSTERECTOMY      TONSILLECTOMY      TUBAL LIGATION       History reviewed. No pertinent family history.  Social History     Tobacco Use    Smoking status: Current Every Day Smoker     Packs/day: 1.00    Smokeless tobacco: Never Used   Substance Use Topics    Alcohol use: Yes     Comment: seldom    Drug use: No     Review of Systems   Musculoskeletal: Positive for back pain.   All other systems reviewed and are negative.      Physical Exam     Initial Vitals [01/09/19 0102]   BP Pulse Resp Temp SpO2   (!) 155/70 (!) 59 18 98 °F (36.7 °C) 100 %      MAP       --         Physical Exam    Nursing note and vitals reviewed.  Constitutional: She appears well-developed and well-nourished.   HENT:   Head: Normocephalic and atraumatic.   Eyes: Conjunctivae and EOM are normal.   Neck: Normal range of motion. Neck supple.   Cardiovascular: Normal rate, regular rhythm and normal heart sounds.   Pulmonary/Chest: Breath sounds normal. She has no wheezes. She has no rhonchi. She has no rales.   Abdominal: Soft.  There is no tenderness. There is no rebound and no guarding.   Musculoskeletal: Normal range of motion.        Lumbar back: She exhibits pain. She exhibits normal range of motion, no tenderness, no bony tenderness, no swelling, no edema, no deformity, no laceration and no spasm.        Back:    Neurological: She is alert and oriented to person, place, and time. GCS score is 15. GCS eye subscore is 4. GCS verbal subscore is 5. GCS motor subscore is 6.   Skin: Skin is warm and dry. Capillary refill takes less than 2 seconds.   Psychiatric: She has a normal mood and affect. Her behavior is normal. Judgment and thought content normal.         ED Course   Procedures  Labs Reviewed   URINALYSIS - Abnormal; Notable for the following components:       Result Value    Occult Blood UA 3+ (*)     Leukocytes, UA 1+ (*)     All other components within normal limits   URINALYSIS MICROSCOPIC - Abnormal; Notable for the following components:    RBC, UA 10 (*)     All other components within normal limits          Imaging Results          CT Renal Stone Study ABD Pelvis WO (In process)               X-Rays:   Independently Interpreted Readings:   Abdomen:   Renal Stone Study - CT failed to reveal any stone within the patient's ureters.  There are no other abnormalities.     Medical Decision Making:   Clinical Tests:   Lab Tests: Ordered and Reviewed  Radiological Study: Ordered and Reviewed                      Clinical Impression:   The encounter diagnosis was Lumbar strain, initial encounter.      Disposition:   Disposition: Discharged  Condition: Stable                        Gwen Bae MD  01/09/19 0208

## 2019-01-15 ENCOUNTER — OFFICE VISIT (OUTPATIENT)
Dept: FAMILY MEDICINE | Facility: CLINIC | Age: 54
End: 2019-01-15
Payer: COMMERCIAL

## 2019-01-15 VITALS
DIASTOLIC BLOOD PRESSURE: 80 MMHG | TEMPERATURE: 98 F | SYSTOLIC BLOOD PRESSURE: 130 MMHG | HEART RATE: 72 BPM | WEIGHT: 245.13 LBS | HEIGHT: 65 IN | BODY MASS INDEX: 40.84 KG/M2 | OXYGEN SATURATION: 98 %

## 2019-01-15 DIAGNOSIS — B02.9 HERPES ZOSTER WITHOUT COMPLICATION: Primary | ICD-10-CM

## 2019-01-15 PROCEDURE — 99213 OFFICE O/P EST LOW 20 MIN: CPT | Mod: S$GLB,,, | Performed by: FAMILY MEDICINE

## 2019-01-15 PROCEDURE — 3075F SYST BP GE 130 - 139MM HG: CPT | Mod: CPTII,S$GLB,, | Performed by: FAMILY MEDICINE

## 2019-01-15 PROCEDURE — 3008F PR BODY MASS INDEX (BMI) DOCUMENTED: ICD-10-PCS | Mod: CPTII,S$GLB,, | Performed by: FAMILY MEDICINE

## 2019-01-15 PROCEDURE — 3079F PR MOST RECENT DIASTOLIC BLOOD PRESSURE 80-89 MM HG: ICD-10-PCS | Mod: CPTII,S$GLB,, | Performed by: FAMILY MEDICINE

## 2019-01-15 PROCEDURE — 3008F BODY MASS INDEX DOCD: CPT | Mod: CPTII,S$GLB,, | Performed by: FAMILY MEDICINE

## 2019-01-15 PROCEDURE — 3075F PR MOST RECENT SYSTOLIC BLOOD PRESS GE 130-139MM HG: ICD-10-PCS | Mod: CPTII,S$GLB,, | Performed by: FAMILY MEDICINE

## 2019-01-15 PROCEDURE — 99213 PR OFFICE/OUTPT VISIT, EST, LEVL III, 20-29 MIN: ICD-10-PCS | Mod: S$GLB,,, | Performed by: FAMILY MEDICINE

## 2019-01-15 PROCEDURE — 3079F DIAST BP 80-89 MM HG: CPT | Mod: CPTII,S$GLB,, | Performed by: FAMILY MEDICINE

## 2019-01-15 RX ORDER — VALACYCLOVIR HYDROCHLORIDE 1 G/1
1000 TABLET, FILM COATED ORAL 3 TIMES DAILY
Qty: 21 TABLET | Refills: 0 | Status: SHIPPED | OUTPATIENT
Start: 2019-01-15 | End: 2020-10-30 | Stop reason: SDUPTHER

## 2019-01-15 RX ORDER — HYDROCODONE BITARTRATE AND ACETAMINOPHEN 7.5; 325 MG/1; MG/1
1 TABLET ORAL EVERY 6 HOURS PRN
Qty: 20 TABLET | Refills: 0 | Status: SHIPPED | OUTPATIENT
Start: 2019-01-15 | End: 2021-11-03

## 2019-01-15 RX ORDER — GABAPENTIN 300 MG/1
300 CAPSULE ORAL 3 TIMES DAILY
Qty: 90 CAPSULE | Refills: 1 | Status: SHIPPED | OUTPATIENT
Start: 2019-01-15 | End: 2020-10-30 | Stop reason: SDUPTHER

## 2019-01-15 NOTE — PROGRESS NOTES
Subjective:       Patient ID: Selam Bird is a 53 y.o. female.    Chief Complaint:   Chief Complaint   Patient presents with    Herpes Zoster       Rash   This is a new problem. The current episode started 1 to 4 weeks ago. The problem has been gradually worsening (Started having pain about 10 days ago.  Was seen in the ER.  No cause found.  Rash showed up about 4 days later. ) since onset. The affected locations include the abdomen (Right flank ). The rash is characterized by burning. She was exposed to nothing. Pertinent negatives include no anorexia, congestion, cough, diarrhea, eye pain, facial edema, fatigue, fever, joint pain, nail changes, rhinorrhea, shortness of breath, sore throat or vomiting. Treatments tried: muscle relaxer.  The treatment provided no relief.     Review of Systems   Constitutional: Negative for activity change, appetite change, chills, fatigue and fever.   HENT: Negative for congestion, ear discharge, ear pain, rhinorrhea, sinus pain, sore throat and trouble swallowing.    Eyes: Negative for photophobia, pain, redness, itching and visual disturbance.   Respiratory: Negative for cough, chest tightness, shortness of breath and wheezing.    Cardiovascular: Negative for chest pain, palpitations and leg swelling.   Gastrointestinal: Negative for abdominal distention, abdominal pain, anorexia, blood in stool, diarrhea, nausea and vomiting.   Genitourinary: Negative for dysuria, pelvic pain, vaginal bleeding, vaginal discharge and vaginal pain.   Musculoskeletal: Negative for arthralgias, back pain, gait problem, joint pain and neck pain.   Skin: Positive for rash. Negative for color change, nail changes and pallor.   Neurological: Negative for dizziness, tremors, weakness, light-headedness, numbness and headaches.   Psychiatric/Behavioral: Negative for agitation, behavioral problems, confusion and sleep disturbance.       Past Medical History:   Diagnosis Date    Depression     HTN  Wellness Visit for Adults   AMBULATORY CARE:   A wellness visit  is when you see your healthcare provider to get screened for health problems  You can also get advice on how to stay healthy  Write down your questions so you remember to ask them  Ask your healthcare provider how often you should have a wellness visit  What happens at a wellness visit:  Your healthcare provider will ask about your health, and your family history of health problems  This includes high blood pressure, heart disease, and cancer  He or she will ask if you have symptoms that concern you, if you smoke, and about your mood  You may also be asked about your intake of medicines, supplements, food, and alcohol  Any of the following may be done:  · Your weight  will be checked  Your height may also be checked so your body mass index (BMI) can be calculated  Your BMI shows if you are at a healthy weight  · Your blood pressure  and heart rate will be checked  Your temperature may also be checked  · Blood and urine tests  may be done  Blood tests may be done to check your cholesterol levels  Abnormal cholesterol levels increase your risk for heart disease and stroke  You may also need a blood or urine test to check for diabetes if you are at increased risk  Urine tests may be done to look for signs of an infection or kidney disease  · A physical exam  includes checking your heartbeat and lungs with a stethoscope  Your healthcare provider may also check your skin to look for sun damage  · Screening tests  may be recommended  A screening test is done to check for diseases that may not cause symptoms  The screening tests you may need depend on your age, gender, family history, and lifestyle habits  For example, colorectal screening may be recommended if you are 48years old or older  Screening tests you need if you are a woman:   · A Pap smear  is used to screen for cervical cancer   Pap smears are usually done every 3 to 5 years (hypertension)     Hypertension      Social History     Socioeconomic History    Marital status:      Spouse name: Not on file    Number of children: Not on file    Years of education: Not on file    Highest education level: Not on file   Social Needs    Financial resource strain: Not on file    Food insecurity - worry: Not on file    Food insecurity - inability: Not on file    Transportation needs - medical: Not on file    Transportation needs - non-medical: Not on file   Occupational History    Not on file   Tobacco Use    Smoking status: Current Every Day Smoker     Packs/day: 1.00    Smokeless tobacco: Never Used   Substance and Sexual Activity    Alcohol use: Yes     Comment: seldom    Drug use: No    Sexual activity: Not on file   Other Topics Concern    Patient feels they ought to cut down on drinking/drug use Not Asked    Patient annoyed by others criticizing their drinking/drug use Not Asked    Patient has felt bad or guilty about drinking/drug use Not Asked    Patient has had a drink/used drugs as an eye opener in the AM Not Asked   Social History Narrative    Not on file       Objective:      Physical Exam   Constitutional: She appears well-developed and well-nourished.   HENT:   Head: Normocephalic.   Eyes: Conjunctivae are normal.   Neck: Normal range of motion. Neck supple.   Cardiovascular: Normal rate, regular rhythm and normal heart sounds.   Pulmonary/Chest: Effort normal and breath sounds normal.   Neurological: She is alert.   Skin: Skin is warm and dry.        Vesicular rash with surrounding erythema following a dermatome.    Psychiatric: Her behavior is normal.       Assessment:       Herpes zoster without complication  -     gabapentin (NEURONTIN) 300 MG capsule; Take 1 capsule (300 mg total) by mouth 3 (three) times daily.  Dispense: 90 capsule; Refill: 1  -     valACYclovir (VALTREX) 1000 MG tablet; Take 1 tablet (1,000 mg total) by mouth 3 (three) times daily.   depending on your age  You may need them more often if you have had abnormal Pap smear test results in the past  Ask your healthcare provider how often you should have a Pap smear  · A mammogram  is an x-ray of your breasts to screen for breast cancer  Experts recommend mammograms every 2 years starting at age 48 years  You may need a mammogram at age 52 years or younger if you have an increased risk for breast cancer  Talk to your healthcare provider about when you should start having mammograms and how often you need them  Vaccines you may need:   · Get an influenza vaccine  every year  The influenza vaccine protects you from the flu  Several types of viruses cause the flu  The viruses change over time, so new vaccines are made each year  · Get a tetanus-diphtheria (Td) booster vaccine  every 10 years  This vaccine protects you against tetanus and diphtheria  Tetanus is a severe infection that may cause painful muscle spasms and lockjaw  Diphtheria is a severe bacterial infection that causes a thick covering in the back of your mouth and throat  · Get a human papillomavirus (HPV) vaccine  if you are female and aged 23 to 32 or male 23 to 24 and never received it  This vaccine protects you from HPV infection  HPV is the most common infection spread by sexual contact  HPV may also cause vaginal, penile, and anal cancers  · Get a pneumococcal vaccine  if you are aged 72 years or older  The pneumococcal vaccine is an injection given to protect you from pneumococcal disease  Pneumococcal disease is an infection caused by pneumococcal bacteria  The infection may cause pneumonia, meningitis, or an ear infection  · Get a shingles vaccine  if you are aged 61 or older, even if you have had shingles before  The shingles vaccine is an injection to protect you from the varicella-zoster virus  This is the same virus that causes chickenpox   Shingles is a painful rash that develops in people who had chickenpox Dispense: 21 tablet; Refill: 0  -     HYDROcodone-acetaminophen (NORCO) 7.5-325 mg per tablet; Take 1 tablet by mouth every 6 (six) hours as needed for Pain.  Dispense: 20 tablet; Refill: 0         or have been exposed to the virus  How to eat healthy:  My Plate is a model for planning healthy meals  It shows the types and amounts of foods that should go on your plate  Fruits and vegetables make up about half of your plate, and grains and protein make up the other half  A serving of dairy is included on the side of your plate  The amount of calories and serving sizes you need depends on your age, gender, weight, and height  Examples of healthy foods are listed below:  · Eat a variety of vegetables  such as dark green, red, and orange vegetables  You can also include canned vegetables low in sodium (salt) and frozen vegetables without added butter or sauces  · Eat a variety of fresh fruits , canned fruit in 100% juice, frozen fruit, and dried fruit  · Include whole grains  At least half of the grains you eat should be whole grains  Examples include whole-wheat bread, wheat pasta, brown rice, and whole-grain cereals such as oatmeal     · Eat a variety of protein foods such as seafood (fish and shellfish), lean meat, and poultry without skin (turkey and chicken)  Examples of lean meats include pork leg, shoulder, or tenderloin, and beef round, sirloin, tenderloin, and extra lean ground beef  Other protein foods include eggs and egg substitutes, beans, peas, soy products, nuts, and seeds  · Choose low-fat dairy products such as skim or 1% milk or low-fat yogurt, cheese, and cottage cheese  · Limit unhealthy fats  such as butter, hard margarine, and shortening  Exercise:  Exercise at least 30 minutes per day on most days of the week  Some examples of exercise include walking, biking, dancing, and swimming  You can also fit in more physical activity by taking the stairs instead of the elevator or parking farther away from stores  Include muscle strengthening activities 2 days each week  Regular exercise provides many health benefits   It helps you manage your weight, and decreases your risk for type 2 diabetes, heart disease, stroke, and high blood pressure  Exercise can also help improve your mood  Ask your healthcare provider about the best exercise plan for you  General health and safety guidelines:   · Do not smoke  Nicotine and other chemicals in cigarettes and cigars can cause lung damage  Ask your healthcare provider for information if you currently smoke and need help to quit  E-cigarettes or smokeless tobacco still contain nicotine  Talk to your healthcare provider before you use these products  · Limit alcohol  A drink of alcohol is 12 ounces of beer, 5 ounces of wine, or 1½ ounces of liquor  · Lose weight, if needed  Being overweight increases your risk of certain health conditions  These include heart disease, high blood pressure, type 2 diabetes, and certain types of cancer  · Protect your skin  Do not sunbathe or use tanning beds  Use sunscreen with a SPF 15 or higher  Apply sunscreen at least 15 minutes before you go outside  Reapply sunscreen every 2 hours  Wear protective clothing, hats, and sunglasses when you are outside  · Drive safely  Always wear your seatbelt  Make sure everyone in your car wears a seatbelt  A seatbelt can save your life if you are in an accident  Do not use your cell phone when you are driving  This could distract you and cause an accident  Pull over if you need to make a call or send a text message  · Practice safe sex  Use latex condoms if are sexually active and have more than one partner  Your healthcare provider may recommend screening tests for sexually transmitted infections (STIs)  · Wear helmets, lifejackets, and protective gear  Always wear a helmet when you ride a bike or motorcycle, go skiing, or play sports that could cause a head injury  Wear protective equipment when you play sports  Wear a lifejacket when you are on a boat or doing water sports    © 2017 [employer] Information is for End User's use only and may not be sold, redistributed or otherwise used for commercial purposes  All illustrations and images included in CareNotes® are the copyrighted property of [employer]  or [employer]  The above information is an  only  It is not intended as medical advice for individual conditions or treatments  Talk to your doctor, nurse or pharmacist before following any medical regimen to see if it is safe and effective for you

## 2019-03-20 ENCOUNTER — OFFICE VISIT (OUTPATIENT)
Dept: FAMILY MEDICINE | Facility: CLINIC | Age: 54
End: 2019-03-20
Payer: COMMERCIAL

## 2019-03-20 VITALS
TEMPERATURE: 98 F | DIASTOLIC BLOOD PRESSURE: 70 MMHG | HEIGHT: 65 IN | HEART RATE: 82 BPM | BODY MASS INDEX: 41.08 KG/M2 | OXYGEN SATURATION: 98 % | WEIGHT: 246.56 LBS | SYSTOLIC BLOOD PRESSURE: 130 MMHG

## 2019-03-20 DIAGNOSIS — Z12.39 BREAST CANCER SCREENING: Primary | ICD-10-CM

## 2019-03-20 DIAGNOSIS — K60.2 ANAL FISSURE: ICD-10-CM

## 2019-03-20 PROCEDURE — 3075F SYST BP GE 130 - 139MM HG: CPT | Mod: CPTII,S$GLB,, | Performed by: FAMILY MEDICINE

## 2019-03-20 PROCEDURE — 3078F DIAST BP <80 MM HG: CPT | Mod: CPTII,S$GLB,, | Performed by: FAMILY MEDICINE

## 2019-03-20 PROCEDURE — 99213 PR OFFICE/OUTPT VISIT, EST, LEVL III, 20-29 MIN: ICD-10-PCS | Mod: S$GLB,,, | Performed by: FAMILY MEDICINE

## 2019-03-20 PROCEDURE — 3008F PR BODY MASS INDEX (BMI) DOCUMENTED: ICD-10-PCS | Mod: CPTII,S$GLB,, | Performed by: FAMILY MEDICINE

## 2019-03-20 PROCEDURE — 3075F PR MOST RECENT SYSTOLIC BLOOD PRESS GE 130-139MM HG: ICD-10-PCS | Mod: CPTII,S$GLB,, | Performed by: FAMILY MEDICINE

## 2019-03-20 PROCEDURE — 3008F BODY MASS INDEX DOCD: CPT | Mod: CPTII,S$GLB,, | Performed by: FAMILY MEDICINE

## 2019-03-20 PROCEDURE — 99213 OFFICE O/P EST LOW 20 MIN: CPT | Mod: S$GLB,,, | Performed by: FAMILY MEDICINE

## 2019-03-20 PROCEDURE — 3078F PR MOST RECENT DIASTOLIC BLOOD PRESSURE < 80 MM HG: ICD-10-PCS | Mod: CPTII,S$GLB,, | Performed by: FAMILY MEDICINE

## 2019-03-20 RX ORDER — NYSTATIN AND TRIAMCINOLONE ACETONIDE 100000; 1 [USP'U]/G; MG/G
CREAM TOPICAL 2 TIMES DAILY
Qty: 15 G | Refills: 1 | Status: SHIPPED | OUTPATIENT
Start: 2019-03-20 | End: 2021-01-29

## 2019-03-20 RX ORDER — SENNOSIDES 25 MG/1
TABLET, FILM COATED ORAL
Refills: 0 | COMMUNITY
Start: 2019-03-20 | End: 2021-01-29

## 2019-03-20 NOTE — PROGRESS NOTES
Subjective:       Patient ID: Selam Bird is a 53 y.o. female.    Chief Complaint:   Chief Complaint   Patient presents with    Rectocele     poss x's 3 weeks        Patient presents for rectal pain.  About 3 weeks ago she was constipated and had a large bowel movement.  She immediately felt pain and saw some bright red blood in the toilet.  Since then she has been applying vaseline to the area and keeping her stools soft, but she continue to have pain especially with sitting and with bowel movements.  No further blood in stools.  No fevers.  No hemorrhoids.       Review of Systems   Constitutional: Negative for activity change, appetite change, chills and fatigue.   HENT: Negative for congestion, ear discharge, ear pain, rhinorrhea, sinus pain, sore throat and trouble swallowing.    Eyes: Negative for photophobia, pain, redness, itching and visual disturbance.   Respiratory: Negative for cough, chest tightness, shortness of breath and wheezing.    Cardiovascular: Negative for chest pain, palpitations and leg swelling.   Gastrointestinal: Positive for anal bleeding. Negative for abdominal distention, abdominal pain, blood in stool, diarrhea, nausea and vomiting.   Genitourinary: Negative for dysuria, pelvic pain, vaginal bleeding, vaginal discharge and vaginal pain.   Musculoskeletal: Negative for arthralgias, back pain, gait problem and neck pain.   Skin: Negative for color change, pallor and rash.   Neurological: Negative for dizziness, tremors, weakness and light-headedness.   Psychiatric/Behavioral: Negative for agitation, behavioral problems, confusion and sleep disturbance.       Past Medical History:   Diagnosis Date    Depression     HTN (hypertension)     Hypertension      Social History     Socioeconomic History    Marital status:      Spouse name: Not on file    Number of children: Not on file    Years of education: Not on file    Highest education level: Not on file   Social Needs     Financial resource strain: Not on file    Food insecurity - worry: Not on file    Food insecurity - inability: Not on file    Transportation needs - medical: Not on file    Transportation needs - non-medical: Not on file   Occupational History    Not on file   Tobacco Use    Smoking status: Current Every Day Smoker     Packs/day: 1.00    Smokeless tobacco: Never Used   Substance and Sexual Activity    Alcohol use: Yes     Comment: seldom    Drug use: No    Sexual activity: Not on file   Other Topics Concern    Patient feels they ought to cut down on drinking/drug use Not Asked    Patient annoyed by others criticizing their drinking/drug use Not Asked    Patient has felt bad or guilty about drinking/drug use Not Asked    Patient has had a drink/used drugs as an eye opener in the AM Not Asked   Social History Narrative    Not on file       Objective:      Physical Exam   Constitutional: She appears well-developed and well-nourished.   HENT:   Head: Normocephalic.   Eyes: Conjunctivae are normal.   Neck: Normal range of motion. Neck supple.   Cardiovascular: Normal rate, regular rhythm and normal heart sounds.   Pulmonary/Chest: Effort normal and breath sounds normal.   Genitourinary:         Genitourinary Comments: Small anal fissure with maceration along the gluteal cleft.    Neurological: She is alert.   Skin: Skin is warm and dry.   Psychiatric: Her behavior is normal.       Assessment:       Breast cancer screening  -     Mammo Digital Screening Bilateral With CAD; Future; Expected date: 03/20/2019    Anal fissure  -     nystatin-triamcinolone (MYCOLOG II) cream; Apply topically 2 (two) times daily.  Dispense: 15 g; Refill: 1  -     lidocaine 5 % Crea; Apply every 2 hours as needed; Refill: 0  - miralax as need to keep stools soft  - Preparation H suppositories may help with pain as well.

## 2019-03-21 ENCOUNTER — TELEPHONE (OUTPATIENT)
Dept: FAMILY MEDICINE | Facility: CLINIC | Age: 54
End: 2019-03-21

## 2019-03-22 ENCOUNTER — TELEPHONE (OUTPATIENT)
Dept: FAMILY MEDICINE | Facility: CLINIC | Age: 54
End: 2019-03-22

## 2019-03-22 DIAGNOSIS — K60.2 ANAL FISSURE: Primary | ICD-10-CM

## 2019-03-22 RX ORDER — TRAMADOL HYDROCHLORIDE 50 MG/1
50 TABLET ORAL EVERY 6 HOURS
Qty: 30 TABLET | Refills: 0 | Status: SHIPPED | OUTPATIENT
Start: 2019-03-22 | End: 2021-01-29

## 2019-03-22 NOTE — TELEPHONE ENCOUNTER
Called pt back and she is wanting something for pain she said this morning was extremley bad and it is always after a bowl movement she uses [facility]

## 2019-03-22 NOTE — TELEPHONE ENCOUNTER
----- Message from Amber Conner sent at 3/22/2019  8:23 AM CDT -----  Contact: [phone]/self  Patient is requesting a call back. She is not feeling any better. Thanks

## 2019-05-15 ENCOUNTER — CLINICAL SUPPORT (OUTPATIENT)
Dept: FAMILY MEDICINE | Facility: CLINIC | Age: 54
End: 2019-05-15
Payer: COMMERCIAL

## 2019-05-15 ENCOUNTER — TELEPHONE (OUTPATIENT)
Dept: FAMILY MEDICINE | Facility: CLINIC | Age: 54
End: 2019-05-15

## 2019-05-15 DIAGNOSIS — R35.0 URINARY FREQUENCY: Primary | ICD-10-CM

## 2019-05-15 LAB
BILIRUB SERPL-MCNC: ABNORMAL MG/DL
BLOOD URINE, POC: 250
COLOR, POC UA: ABNORMAL
GLUCOSE UR QL STRIP: ABNORMAL
KETONES UR QL STRIP: ABNORMAL
LEUKOCYTE ESTERASE URINE, POC: ABNORMAL
NITRITE, POC UA: ABNORMAL
PH, POC UA: 7
PROTEIN, POC: ABNORMAL
SPECIFIC GRAVITY, POC UA: 1.01
UROBILINOGEN, POC UA: ABNORMAL

## 2019-05-15 PROCEDURE — 81002 POCT URINE DIPSTICK WITHOUT MICROSCOPE: ICD-10-PCS | Mod: S$GLB,,, | Performed by: FAMILY MEDICINE

## 2019-05-15 PROCEDURE — 87086 URINE CULTURE/COLONY COUNT: CPT

## 2019-05-15 PROCEDURE — 81002 URINALYSIS NONAUTO W/O SCOPE: CPT | Mod: S$GLB,,, | Performed by: FAMILY MEDICINE

## 2019-05-15 RX ORDER — SULFAMETHOXAZOLE AND TRIMETHOPRIM 800; 160 MG/1; MG/1
1 TABLET ORAL 2 TIMES DAILY
Qty: 14 TABLET | Refills: 0 | Status: SHIPPED | OUTPATIENT
Start: 2019-05-15 | End: 2020-11-25 | Stop reason: SDUPTHER

## 2019-05-15 NOTE — TELEPHONE ENCOUNTER
----- Message from Shazia Moreno sent at 5/15/2019  8:03 AM CDT -----  Contact: [phone]  Type:  Same Day Appointment Request    Caller is requesting a same day appointment.  Caller declined first available appointment listed below.      Name of Caller: Selam    When is the first available appointment? 05/22/19    Symptoms: Bladder infection    Best Call Back Number: [phone]    Additional Information: Dr. Lincoln pt

## 2019-05-15 NOTE — TELEPHONE ENCOUNTER
Please sign order.     Spoke with pt in regards to message. She will stop by the office after lunch to provide urine specimen.

## 2019-05-17 LAB — BACTERIA UR CULT: NO GROWTH

## 2019-09-30 ENCOUNTER — TELEPHONE (OUTPATIENT)
Dept: FAMILY MEDICINE | Facility: CLINIC | Age: 54
End: 2019-09-30

## 2019-09-30 NOTE — TELEPHONE ENCOUNTER
----- Message from Sheryl Bass sent at 9/30/2019  9:32 AM CDT -----  Contact: [phone]/self  Type:  Same Day Appointment Request    Caller is requesting a same day appointment.  Caller declined first available appointment listed below.    Name of Caller:PATIENT   When is the first available appointment? 10-1-2019  Symptoms: SWOLLEN LEG  Best Call Back Number:[phone]  Additional Information: PATIENT WOULD LIKE TO BE SEEN TODAY

## 2019-09-30 NOTE — TELEPHONE ENCOUNTER
Spoke to pt and she was notified  doesn't work on Mondays. Pt understood and will go to urgent care.

## 2019-10-01 ENCOUNTER — TELEPHONE (OUTPATIENT)
Dept: FAMILY MEDICINE | Facility: CLINIC | Age: 54
End: 2019-10-01

## 2019-10-01 NOTE — TELEPHONE ENCOUNTER
----- Message from Mireya Velazquez sent at 10/1/2019  8:05 AM CDT -----  Contact: pt  Type:  Sooner Apoointment Request    Caller is requesting a sooner appointment.  Caller declined first available appointment listed below.  Caller will not accept being placed on the waitlist and is requesting a message be sent to doctor.  Name of Caller: Pt  When is the first available appointment? 10/3/19  Symptoms: staff infection  Would the patient rather a call back or a response via [facility]?   [facility] Call Back Number:[phone]  Additional Information:

## 2019-12-07 DIAGNOSIS — F33.2 MDD (MAJOR DEPRESSIVE DISORDER), RECURRENT SEVERE, WITHOUT PSYCHOSIS: ICD-10-CM

## 2019-12-07 RX ORDER — CITALOPRAM 20 MG/1
TABLET, FILM COATED ORAL
Qty: 45 TABLET | Refills: 0 | OUTPATIENT
Start: 2019-12-07

## 2019-12-11 ENCOUNTER — TELEPHONE (OUTPATIENT)
Dept: FAMILY MEDICINE | Facility: CLINIC | Age: 54
End: 2019-12-11

## 2019-12-11 DIAGNOSIS — F33.2 MDD (MAJOR DEPRESSIVE DISORDER), RECURRENT SEVERE, WITHOUT PSYCHOSIS: Primary | ICD-10-CM

## 2019-12-11 RX ORDER — CITALOPRAM 20 MG/1
TABLET, FILM COATED ORAL
Qty: 45 TABLET | Refills: 11 | Status: SHIPPED | OUTPATIENT
Start: 2019-12-11 | End: 2020-11-03 | Stop reason: SDUPTHER

## 2019-12-11 NOTE — TELEPHONE ENCOUNTER
----- Message from Montserrat Emery sent at 12/11/2019 10:07 AM CST -----  Contact: self  Selam Margaritachandra is requesting a callback concerning her medication. please call.

## 2019-12-11 NOTE — TELEPHONE ENCOUNTER
Spoke to pt and she would like a script for celexa 20 mg #45  1 and a half every day sent to [facility] here in [place]. Pt states she takes this everyday.

## 2019-12-14 DIAGNOSIS — I10 ESSENTIAL HYPERTENSION: ICD-10-CM

## 2019-12-14 DIAGNOSIS — F33.2 MDD (MAJOR DEPRESSIVE DISORDER), RECURRENT SEVERE, WITHOUT PSYCHOSIS: ICD-10-CM

## 2019-12-14 RX ORDER — BUPROPION HYDROCHLORIDE 150 MG/1
TABLET ORAL
Qty: 30 TABLET | Refills: 0 | OUTPATIENT
Start: 2019-12-14

## 2019-12-17 ENCOUNTER — OFFICE VISIT (OUTPATIENT)
Dept: FAMILY MEDICINE | Facility: CLINIC | Age: 54
End: 2019-12-17
Payer: COMMERCIAL

## 2019-12-17 VITALS
OXYGEN SATURATION: 97 % | HEIGHT: 65 IN | WEIGHT: 249.44 LBS | TEMPERATURE: 98 F | DIASTOLIC BLOOD PRESSURE: 80 MMHG | BODY MASS INDEX: 41.56 KG/M2 | SYSTOLIC BLOOD PRESSURE: 140 MMHG | HEART RATE: 62 BPM

## 2019-12-17 DIAGNOSIS — M25.511 ACUTE PAIN OF RIGHT SHOULDER: ICD-10-CM

## 2019-12-17 DIAGNOSIS — Z23 NEED FOR INFLUENZA VACCINATION: ICD-10-CM

## 2019-12-17 DIAGNOSIS — I10 ESSENTIAL HYPERTENSION: ICD-10-CM

## 2019-12-17 DIAGNOSIS — N95.0 POST-MENOPAUSAL BLEEDING: Primary | ICD-10-CM

## 2019-12-17 DIAGNOSIS — F41.9 ANXIETY: ICD-10-CM

## 2019-12-17 DIAGNOSIS — F33.2 MDD (MAJOR DEPRESSIVE DISORDER), RECURRENT SEVERE, WITHOUT PSYCHOSIS: ICD-10-CM

## 2019-12-17 PROCEDURE — 3008F PR BODY MASS INDEX (BMI) DOCUMENTED: ICD-10-PCS | Mod: CPTII,S$GLB,, | Performed by: FAMILY MEDICINE

## 2019-12-17 PROCEDURE — 20605 DRAIN/INJ JOINT/BURSA W/O US: CPT | Mod: RT,S$GLB,, | Performed by: FAMILY MEDICINE

## 2019-12-17 PROCEDURE — 3077F SYST BP >= 140 MM HG: CPT | Mod: CPTII,S$GLB,, | Performed by: FAMILY MEDICINE

## 2019-12-17 PROCEDURE — 90686 FLU VACCINE (QUAD) GREATER THAN OR EQUAL TO 3YO PRESERVATIVE FREE IM: ICD-10-PCS | Mod: S$GLB,,, | Performed by: FAMILY MEDICINE

## 2019-12-17 PROCEDURE — 3008F BODY MASS INDEX DOCD: CPT | Mod: CPTII,S$GLB,, | Performed by: FAMILY MEDICINE

## 2019-12-17 PROCEDURE — 3079F PR MOST RECENT DIASTOLIC BLOOD PRESSURE 80-89 MM HG: ICD-10-PCS | Mod: CPTII,S$GLB,, | Performed by: FAMILY MEDICINE

## 2019-12-17 PROCEDURE — 3077F PR MOST RECENT SYSTOLIC BLOOD PRESSURE >= 140 MM HG: ICD-10-PCS | Mod: CPTII,S$GLB,, | Performed by: FAMILY MEDICINE

## 2019-12-17 PROCEDURE — 3079F DIAST BP 80-89 MM HG: CPT | Mod: CPTII,S$GLB,, | Performed by: FAMILY MEDICINE

## 2019-12-17 PROCEDURE — 99214 PR OFFICE/OUTPT VISIT, EST, LEVL IV, 30-39 MIN: ICD-10-PCS | Mod: 25,S$GLB,, | Performed by: FAMILY MEDICINE

## 2019-12-17 PROCEDURE — 20605 PR DRAIN/INJECT INTERMEDIATE JOINT/BURSA: ICD-10-PCS | Mod: RT,S$GLB,, | Performed by: FAMILY MEDICINE

## 2019-12-17 PROCEDURE — 99214 OFFICE O/P EST MOD 30 MIN: CPT | Mod: 25,S$GLB,, | Performed by: FAMILY MEDICINE

## 2019-12-17 PROCEDURE — 90471 IMMUNIZATION ADMIN: CPT | Mod: 59,S$GLB,, | Performed by: FAMILY MEDICINE

## 2019-12-17 PROCEDURE — 90471 PR IMMUNIZ ADMIN,1 SINGLE/COMB VAC/TOXOID: ICD-10-PCS | Mod: 59,S$GLB,, | Performed by: FAMILY MEDICINE

## 2019-12-17 PROCEDURE — 90686 IIV4 VACC NO PRSV 0.5 ML IM: CPT | Mod: S$GLB,,, | Performed by: FAMILY MEDICINE

## 2019-12-17 RX ORDER — HYDROCHLOROTHIAZIDE 25 MG/1
TABLET ORAL
Qty: 30 TABLET | Refills: 0 | OUTPATIENT
Start: 2019-12-17

## 2019-12-17 RX ORDER — BUSPIRONE HYDROCHLORIDE 10 MG/1
10 TABLET ORAL 3 TIMES DAILY PRN
Qty: 90 TABLET | Refills: 11 | Status: SHIPPED | OUTPATIENT
Start: 2019-12-17 | End: 2021-01-29

## 2019-12-17 RX ORDER — HYDROCHLOROTHIAZIDE 25 MG/1
25 TABLET ORAL DAILY
Qty: 30 TABLET | Refills: 11 | Status: SHIPPED | OUTPATIENT
Start: 2019-12-17 | End: 2020-11-03 | Stop reason: SDUPTHER

## 2019-12-17 RX ORDER — BUPROPION HYDROCHLORIDE 150 MG/1
150 TABLET ORAL DAILY
Qty: 30 TABLET | Refills: 11 | Status: SHIPPED | OUTPATIENT
Start: 2019-12-17 | End: 2020-11-03 | Stop reason: SDUPTHER

## 2019-12-17 RX ORDER — TRIAMCINOLONE ACETONIDE 40 MG/ML
40 INJECTION, SUSPENSION INTRA-ARTICULAR; INTRAMUSCULAR
Status: DISCONTINUED | OUTPATIENT
Start: 2019-12-17 | End: 2019-12-17 | Stop reason: HOSPADM

## 2019-12-17 RX ADMIN — TRIAMCINOLONE ACETONIDE 40 MG: 40 INJECTION, SUSPENSION INTRA-ARTICULAR; INTRAMUSCULAR at 03:12

## 2019-12-17 NOTE — PROGRESS NOTES
Subjective:       Patient ID: Selam Bird is a 54 y.o. female.    Chief Complaint:   Chief Complaint   Patient presents with    Follow-up         History of depression:  Was hospitalized in 2016 for suicidal ideations at that time she was put on wellbutrin and celexa.  She has been on these since then.  No Suicidal thoughts.  Mood is good.  Tolerates her medication well.  Taking celexa plus wellbutrin.  Reports increased anxiety recently.  Feels frustrated and overwhelmed at work.  Frequently snaps as people.      Post-menopausal bleeding:   Reports some spotting after sex.  Started about 1 month ago.  Denies any pain or vaginal dryness.  History of a hysterectomy.     Hypertension   This is a chronic problem. The current episode started more than 1 month ago (need a refll on HCTZ she has two left). The problem is unchanged. The problem is uncontrolled. Pertinent negatives include no anxiety, chest pain, headaches, neck pain, palpitations, peripheral edema, shortness of breath or sweats. There are no associated agents to hypertension. Risk factors for coronary artery disease include sedentary lifestyle and obesity. Treatments tried: HCTZ. There is no history of angina, kidney disease, CAD/MI, CVA, heart failure, left ventricular hypertrophy or retinopathy.   Shoulder Pain    The pain is present in the right shoulder. This is a chronic problem. The current episode started more than 1 year ago. There has been no history of extremity trauma. The problem occurs intermittently. The problem has been unchanged. The quality of the pain is described as aching and burning. The pain is moderate. Associated symptoms include a limited range of motion. Pertinent negatives include no headaches, inability to bear weight, joint locking, joint swelling, numbness or visual symptoms. The symptoms are aggravated by cold. She has tried NSAIDS for the symptoms. Family history includes arthritis. There is no history of diabetes, Injuries  to Extremity or migraines.     Review of Systems   Constitutional: Negative for activity change, appetite change, chills and fatigue.   HENT: Negative for congestion, ear discharge, ear pain, rhinorrhea, sinus pain, sore throat and trouble swallowing.    Eyes: Negative for photophobia, pain, redness, itching and visual disturbance.   Respiratory: Negative for cough, chest tightness, shortness of breath and wheezing.    Cardiovascular: Negative for chest pain, palpitations and leg swelling.   Gastrointestinal: Negative for abdominal distention, abdominal pain, blood in stool, diarrhea, nausea and vomiting.   Genitourinary: Negative for dysuria, pelvic pain, vaginal bleeding, vaginal discharge and vaginal pain.   Musculoskeletal: Negative for arthralgias, back pain, gait problem and neck pain.   Skin: Negative for color change, pallor and rash.   Neurological: Negative for dizziness, tremors, weakness, light-headedness, numbness and headaches.   Psychiatric/Behavioral: Negative for agitation, behavioral problems, confusion and sleep disturbance.       Past Medical History:   Diagnosis Date    Depression     HTN (hypertension)     Hypertension      Social History     Socioeconomic History    Marital status:      Spouse name: Not on file    Number of children: Not on file    Years of education: Not on file    Highest education level: Not on file   Occupational History    Not on file   Social Needs    Financial resource strain: Not on file    Food insecurity:     Worry: Not on file     Inability: Not on file    Transportation needs:     Medical: Not on file     Non-medical: Not on file   Tobacco Use    Smoking status: Current Every Day Smoker     Packs/day: 1.00    Smokeless tobacco: Never Used   Substance and Sexual Activity    Alcohol use: Yes     Comment: seldom    Drug use: No    Sexual activity: Not on file   Lifestyle    Physical activity:     Days per week: Not on file     Minutes per  "session: Not on file    Stress: Not on file   Relationships    Social connections:     Talks on phone: Not on file     Gets together: Not on file     Attends Taoist service: Not on file     Active member of club or organization: Not on file     Attends meetings of clubs or organizations: Not on file     Relationship status: Not on file   Other Topics Concern    Patient feels they ought to cut down on drinking/drug use Not Asked    Patient annoyed by others criticizing their drinking/drug use Not Asked    Patient has felt bad or guilty about drinking/drug use Not Asked    Patient has had a drink/used drugs as an eye opener in the AM Not Asked   Social History Narrative    Not on file       Objective:     BP (!) 140/80 (BP Location: Left arm, Patient Position: Sitting)   Pulse 62   Temp 98.4 °F (36.9 °C) (Oral)   Ht 5' 5" (1.651 m)   Wt 113.2 kg (249 lb 7.2 oz)   SpO2 97%   BMI 41.51 kg/m²     Physical Exam   Constitutional: She appears well-developed and well-nourished.   HENT:   Head: Normocephalic.   Eyes: Conjunctivae are normal.   Neck: Normal range of motion. Neck supple.   Cardiovascular: Normal rate, regular rhythm and normal heart sounds.   Pulmonary/Chest: Effort normal and breath sounds normal.   Neurological: She is alert.   Skin: Skin is warm and dry.   Psychiatric: Her behavior is normal.        ASSESSMENT:    Post-menopausal bleeding  -     Ambulatory referral to Obstetrics / Gynecology    MDD (major depressive disorder), recurrent severe, without psychosis  -     buPROPion (WELLBUTRIN XL) 150 MG TB24 tablet; Take 1 tablet (150 mg total) by mouth once daily.  Dispense: 30 tablet; Refill: 11    Essential hypertension  -     [facility] Enroll[facility] Order  -     Hypertension Digital Medicine ([facility]): Assign Onboarding Questionnaires  -     hydroCHLOROthiazide (HYDRODIURIL) 25 MG tablet; Take 1 tablet (25 mg total) by mouth once daily.  Dispense: 30 tablet; Refill: " 11    Need for tetanus booster  -     (In Office Administered) Tdap Vaccine    Anxiety  -     busPIRone (BUSPAR) 10 MG tablet; Take 1 tablet (10 mg total) by mouth 3 (three) times daily as needed.  Dispense: 90 tablet; Refill: 11    Other orders  -     NURSING COMMUNICATION: Create [facility] Account

## 2019-12-17 NOTE — PROCEDURES
Intermediate Joint Aspiration/Injection  Date/Time: 12/17/2019 3:00 PM  Performed by: Lexi Lincoln DO  Authorized by: Lexi Lincoln, DO     Consent Done?:  Yes (Verbal)  Indications:  Pain  Site marked: The procedure site was marked    Timeout: Prior to procedure the correct patient, procedure, and site was verified    Prep: Patient was prepped and draped in usual sterile fashion    Needle size:  22 G  Approach:  Posterior  Medications:  40 mg triamcinolone acetonide 40 mg/mL  Patient tolerance:  Patient tolerated the procedure well with no immediate complications

## 2019-12-18 ENCOUNTER — TELEPHONE (OUTPATIENT)
Dept: FAMILY MEDICINE | Facility: CLINIC | Age: 54
End: 2019-12-18

## 2019-12-19 ENCOUNTER — TELEPHONE (OUTPATIENT)
Dept: FAMILY MEDICINE | Facility: CLINIC | Age: 54
End: 2019-12-19

## 2020-01-14 ENCOUNTER — OFFICE VISIT (OUTPATIENT)
Dept: OBSTETRICS AND GYNECOLOGY | Facility: CLINIC | Age: 55
End: 2020-01-14
Payer: COMMERCIAL

## 2020-01-14 VITALS
HEIGHT: 65 IN | BODY MASS INDEX: 41.59 KG/M2 | WEIGHT: 249.63 LBS | DIASTOLIC BLOOD PRESSURE: 80 MMHG | SYSTOLIC BLOOD PRESSURE: 144 MMHG

## 2020-01-14 DIAGNOSIS — Z12.31 SCREENING MAMMOGRAM, ENCOUNTER FOR: ICD-10-CM

## 2020-01-14 DIAGNOSIS — Z01.419 ENCOUNTER FOR ANNUAL ROUTINE GYNECOLOGICAL EXAMINATION: Primary | ICD-10-CM

## 2020-01-14 PROCEDURE — 3077F SYST BP >= 140 MM HG: CPT | Mod: CPTII,S$GLB,, | Performed by: OBSTETRICS & GYNECOLOGY

## 2020-01-14 PROCEDURE — 3079F DIAST BP 80-89 MM HG: CPT | Mod: CPTII,S$GLB,, | Performed by: OBSTETRICS & GYNECOLOGY

## 2020-01-14 PROCEDURE — 3077F PR MOST RECENT SYSTOLIC BLOOD PRESSURE >= 140 MM HG: ICD-10-PCS | Mod: CPTII,S$GLB,, | Performed by: OBSTETRICS & GYNECOLOGY

## 2020-01-14 PROCEDURE — 87481 CANDIDA DNA AMP PROBE: CPT | Mod: 59

## 2020-01-14 PROCEDURE — 3079F PR MOST RECENT DIASTOLIC BLOOD PRESSURE 80-89 MM HG: ICD-10-PCS | Mod: CPTII,S$GLB,, | Performed by: OBSTETRICS & GYNECOLOGY

## 2020-01-14 PROCEDURE — 99999 PR PBB SHADOW E&M-EST. PATIENT-LVL III: ICD-10-PCS | Mod: PBBFAC,,, | Performed by: OBSTETRICS & GYNECOLOGY

## 2020-01-14 PROCEDURE — 99386 PR PREVENTIVE VISIT,NEW,40-64: ICD-10-PCS | Mod: S$GLB,,, | Performed by: OBSTETRICS & GYNECOLOGY

## 2020-01-14 PROCEDURE — 87661 TRICHOMONAS VAGINALIS AMPLIF: CPT

## 2020-01-14 PROCEDURE — 99999 PR PBB SHADOW E&M-EST. PATIENT-LVL III: CPT | Mod: PBBFAC,,, | Performed by: OBSTETRICS & GYNECOLOGY

## 2020-01-14 PROCEDURE — 99386 PREV VISIT NEW AGE 40-64: CPT | Mod: S$GLB,,, | Performed by: OBSTETRICS & GYNECOLOGY

## 2020-01-14 NOTE — PROGRESS NOTES
Chief Complaint   Patient presents with    Annual Exam     bleeding after intercourse       HISTORY OF PRESENT ILLNESS:   Selam Bird is a 54 y.o. female  who presents for well woman exam.  No LMP recorded. Patient has had a hysterectomy.. She had a lsc hysterectomy 2/2 fibroids, kept ovaries.   She reports that about 3 months ago she started noticing vaginal spotting after intercourse when she would wipe, would last for less than a day after intercourse. She has no pain during intercourse or after. This is not a new partner. She is certain it is not coming rectally. She hasn't had a colonoscopy. She doesn't have any bladder issues.        Past Medical History:   Diagnosis Date    Depression     HTN (hypertension)     Hypertension         Past Surgical History:   Procedure Laterality Date    GALLBLADDER SURGERY      HYSTERECTOMY      TONSILLECTOMY      TUBAL LIGATION         Social History     Socioeconomic History    Marital status:      Spouse name: Not on file    Number of children: Not on file    Years of education: Not on file    Highest education level: Not on file   Occupational History    Not on file   Social Needs    Financial resource strain: Not on file    Food insecurity:     Worry: Not on file     Inability: Not on file    Transportation needs:     Medical: Not on file     Non-medical: Not on file   Tobacco Use    Smoking status: Current Every Day Smoker     Packs/day: 1.00    Smokeless tobacco: Never Used   Substance and Sexual Activity    Alcohol use: Yes     Comment: seldom    Drug use: No    Sexual activity: Yes   Lifestyle    Physical activity:     Days per week: Not on file     Minutes per session: Not on file    Stress: Not at all   Relationships    Social connections:     Talks on phone: Not on file     Gets together: Not on file     Attends Bahai service: Not on file     Active member of club or organization: Not on file     Attends meetings of clubs or  "organizations: Not on file     Relationship status: Not on file   Other Topics Concern    Patient feels they ought to cut down on drinking/drug use Not Asked    Patient annoyed by others criticizing their drinking/drug use Not Asked    Patient has felt bad or guilty about drinking/drug use Not Asked    Patient has had a drink/used drugs as an eye opener in the AM Not Asked   Social History Narrative    Not on file       History reviewed. No pertinent family history.    OB History    Para Term  AB Living   1 1 1     1   SAB TAB Ectopic Multiple Live Births                  # Outcome Date GA Lbr [place]/2nd Weight Sex Delivery Anes PTL Lv   1 Term                COMPREHENSIVE GYN HISTORY:  PAP History: Denies abnormal Paps  Infection History: Denies STDs. Denies PID.  Benign History: had uterine fibroids. Denies ovarian cysts. Denies endometriosis Denies other conditions.  Cancer History: Denies cervical cancer. Denies uterine cancer or hyperplasia. Denies ovarian cancer. Denies vulvar cancer or pre-cancer. Denies vaginal cancer or pre-cancer. Denies breast cancer. Denies colon cancer.  Cycle: nl age/ irregular and had uterine fibroids     ROS:  GENERAL: Denies weight gain or weight loss. Feeling well overall.   SKIN: Denies rash or lesions.   HEAD: Denies headache.   NODES: Denies enlarged lymph nodes.   CHEST: Denies shortness of breath.   ABDOMEN: No abdominal pain, constipation, diarrhea, nausea, vomiting or rectal bleeding.   URINARY: No frequency, dysuria, hematuria, or burning on urination.  REPRODUCTIVE: See HPI.   BREASTS: The patient denies pain, lumps, or nipple discharge.       BP (!) 144/80   Ht 5' 5" (1.651 m)   Wt 113.2 kg (249 lb 9.6 oz)   BMI 41.54 kg/m²     APPEARANCE: Well nourished, well developed, in no acute distress.  NECK: Neck symmetric without  thyromegaly.  NODES: No inguinal, cervical lymph node enlargement.  CHEST: Lungs clear to auscultation.  HEART: Regular rate and " rhythm, no murmurs, rubs or gallops.  ABDOMEN: Soft. No tenderness or masses. No hernias. No hepatosplenomegaly.  BREASTS: Symmetrical, no skin changes or visible lesions. No palpable masses, nipple discharge or adenopathy bilaterally.  PELVIC:   VULVA: multiple sebaceous cysts in left and right labia majora. Normal female genitalia.  URETHRAL MEATUS: Normal size and location, no lesions, no prolapse.  URETHRA: No masses, tenderness, prolapse or scarring.  VAGINA: atrophic, no discharge, no significant cystocele or rectocele.  CERVIX: surgically absent   UTERUS: surgically absent   ADNEXA: No masses or tenderness.  PERINEUM: Normal, mo masses    Data Reviewed:     Lipid profile: Date:   Lab Results   Component Value Date    CHOL 275 (H) 04/02/2017     Lab Results   Component Value Date    HDL 41 04/02/2017     Lab Results   Component Value Date    LDLCALC 195.2 (H) 04/02/2017     Lab Results   Component Value Date    TRIG 194 (H) 04/02/2017     Lab Results   Component Value Date    CHOLHDL 14.9 (L) 04/02/2017     TSH:   Lab Results   Component Value Date    TSH 1.689 04/02/2017       1. Encounter for annual routine gynecological examination    2. Screening mammogram, encounter for        A/P 1. Routine gyn annual exam. s/p normal breast exam and MMG ordered.  Pap with HPV cotesting not indicated as no h/o abn and no cervix left.   2. No obvious cause of bleeding after intercourse. Going to wear a condom to make sure blood not in semen. Also recommend rectal evaluation which she declined today and she doesn't want to do colonoscopy. Recommending doing FOBT with PCP.     F/u in 1 yr or PRN

## 2020-01-14 NOTE — LETTER
January 14, 2020      Lexi Lincoln,   [address] [address]           [address], [address]  [facility] [address]  Phone: [phone]  Fax: [phone]          Patient: Selam Bird   MR Number: [id number]   YOB: 1965   Date of Visit: 1/14/2020       Dear Dr. Lexi Lincoln:    Thank you for referring Selam Bird to me for evaluation. Attached you will find relevant portions of my assessment and plan of care.    If you have questions, please do not hesitate to call me. I look forward to following Selam Bird along with you.    Sincerely,    Dorothea Ferraro MD    Enclosure  CC:  No Recipients    If you would like to receive this communication electronically, please contact [email or web address] or [phone] to request more information on [employer] Link access.    For providers and/or their staff who would like to refer a patient to [facility], please contact us through our one-stop-shop provider referral line, [facility] , at [phone].    If you feel you have received this communication in error or would no longer like to receive these types of communications, please e-mail [email or web address]

## 2020-01-15 LAB
BACTERIAL VAGINOSIS DNA: NEGATIVE
CANDIDA GLABRATA DNA: NEGATIVE
CANDIDA KRUSEI DNA: NEGATIVE
CANDIDA RRNA VAG QL PROBE: NEGATIVE
T VAGINALIS RRNA GENITAL QL PROBE: NEGATIVE

## 2020-07-09 ENCOUNTER — TELEPHONE (OUTPATIENT)
Dept: FAMILY MEDICINE | Facility: CLINIC | Age: 55
End: 2020-07-09

## 2020-07-09 DIAGNOSIS — R05.9 COUGH: ICD-10-CM

## 2020-07-09 DIAGNOSIS — M79.10 MUSCLE PAIN: ICD-10-CM

## 2020-07-09 NOTE — TELEPHONE ENCOUNTER
Patient states she's not really sure if she's having symptoms. She's fatigue, she feels the gland in her neck is swollen. She's in contact with people all day so she doesn't know if she's been in contact with anyone with the virus.  She works at the court [employer]     ----- Message from Angela Emery sent at 7/9/2020  3:52 PM CDT -----  Type:  Needs Medical Advice    Who Called:patient   Reason:She would like to have orders put in for a covid test.  Would the patient rather a call back or a response via [facility]? call  Best Call Back Number: [phone]  Additional Information: none

## 2020-07-09 NOTE — TELEPHONE ENCOUNTER
PATIENT NOTIFIED. Patient advised to walk-in for covid testing at [facility] on [address] in [place].  No further action needed at this time.

## 2020-07-10 ENCOUNTER — LAB VISIT (OUTPATIENT)
Dept: URGENT CARE | Facility: CLINIC | Age: 55
End: 2020-07-10
Payer: COMMERCIAL

## 2020-07-10 VITALS — TEMPERATURE: 98 F

## 2020-07-10 DIAGNOSIS — M79.10 MUSCLE PAIN: ICD-10-CM

## 2020-07-10 DIAGNOSIS — R05.9 COUGH: ICD-10-CM

## 2020-07-10 DIAGNOSIS — Z12.11 COLON CANCER SCREENING: ICD-10-CM

## 2020-07-10 PROCEDURE — U0003 INFECTIOUS AGENT DETECTION BY NUCLEIC ACID (DNA OR RNA); SEVERE ACUTE RESPIRATORY SYNDROME CORONAVIRUS 2 (SARS-COV-2) (CORONAVIRUS DISEASE [COVID-19]), AMPLIFIED PROBE TECHNIQUE, MAKING USE OF HIGH THROUGHPUT TECHNOLOGIES AS DESCRIBED BY CMS-2020-01-R: HCPCS

## 2020-07-13 LAB — SARS-COV-2 RNA RESP QL NAA+PROBE: NOT DETECTED

## 2020-10-13 ENCOUNTER — PATIENT OUTREACH (OUTPATIENT)
Dept: FAMILY MEDICINE | Facility: CLINIC | Age: 55
End: 2020-10-13

## 2020-10-16 ENCOUNTER — TELEPHONE (OUTPATIENT)
Dept: FAMILY MEDICINE | Facility: CLINIC | Age: 55
End: 2020-10-16

## 2020-10-30 ENCOUNTER — TELEPHONE (OUTPATIENT)
Dept: FAMILY MEDICINE | Facility: CLINIC | Age: 55
End: 2020-10-30

## 2020-10-30 DIAGNOSIS — B02.9 HERPES ZOSTER WITHOUT COMPLICATION: ICD-10-CM

## 2020-10-30 RX ORDER — VALACYCLOVIR HYDROCHLORIDE 1 G/1
1000 TABLET, FILM COATED ORAL 3 TIMES DAILY
Qty: 21 TABLET | Refills: 0 | Status: SHIPPED | OUTPATIENT
Start: 2020-10-30 | End: 2021-01-29

## 2020-10-30 RX ORDER — GABAPENTIN 300 MG/1
300 CAPSULE ORAL 3 TIMES DAILY
Qty: 90 CAPSULE | Refills: 1 | Status: SHIPPED | OUTPATIENT
Start: 2020-10-30 | End: 2021-01-29

## 2020-10-30 NOTE — TELEPHONE ENCOUNTER
Spoke with patient and she reports painful rash to right lower back x 5 days. Some blisters forming. Reports she has had shingles before and this feels the same way. Wants to know if you can send something to [facility] in [place].

## 2020-10-30 NOTE — TELEPHONE ENCOUNTER
----- Message from Patrizia Ortiz sent at 10/30/2020  8:12 AM CDT -----  Regarding: Same Day  Contact: [phone]  Type:  Same Day Appointment Request    Caller is requesting a same day appointment.  Caller declined first available appointment listed below.    Name of Caller: REMINGTON BROCK [[id number]]  When is the first available appointment? November 5   Symptoms: Shingles severe pain   Best Call Back Number: [phone]  Additional Information: none

## 2020-11-02 ENCOUNTER — TELEPHONE (OUTPATIENT)
Dept: FAMILY MEDICINE | Facility: CLINIC | Age: 55
End: 2020-11-02

## 2020-11-02 NOTE — TELEPHONE ENCOUNTER
Patient has been scheduled       ----- Message from Alley Wylie sent at 11/2/2020  1:20 PM CST -----  Regarding: Appointment  Type: Appointment Request    Name of Caller:patient experiencing back pain right side need appointment for tomorrow with Dr Lincoln  Symptoms:  Best Call Back Number:[phone]  Additional Information:

## 2020-11-03 ENCOUNTER — OFFICE VISIT (OUTPATIENT)
Dept: FAMILY MEDICINE | Facility: CLINIC | Age: 55
End: 2020-11-03
Payer: COMMERCIAL

## 2020-11-03 VITALS
DIASTOLIC BLOOD PRESSURE: 80 MMHG | WEIGHT: 251.56 LBS | TEMPERATURE: 97 F | HEIGHT: 65 IN | SYSTOLIC BLOOD PRESSURE: 134 MMHG | OXYGEN SATURATION: 97 % | BODY MASS INDEX: 41.91 KG/M2 | HEART RATE: 72 BPM

## 2020-11-03 DIAGNOSIS — Z23 NEED FOR INFLUENZA VACCINATION: ICD-10-CM

## 2020-11-03 DIAGNOSIS — I10 ESSENTIAL HYPERTENSION: ICD-10-CM

## 2020-11-03 DIAGNOSIS — Z13.220 LIPID SCREENING: ICD-10-CM

## 2020-11-03 DIAGNOSIS — F33.2 MDD (MAJOR DEPRESSIVE DISORDER), RECURRENT SEVERE, WITHOUT PSYCHOSIS: ICD-10-CM

## 2020-11-03 DIAGNOSIS — M54.50 ACUTE RIGHT-SIDED LOW BACK PAIN WITHOUT SCIATICA: Primary | ICD-10-CM

## 2020-11-03 PROCEDURE — 3008F PR BODY MASS INDEX (BMI) DOCUMENTED: ICD-10-PCS | Mod: CPTII,S$GLB,, | Performed by: FAMILY MEDICINE

## 2020-11-03 PROCEDURE — 99214 PR OFFICE/OUTPT VISIT, EST, LEVL IV, 30-39 MIN: ICD-10-PCS | Mod: 25,S$GLB,, | Performed by: FAMILY MEDICINE

## 2020-11-03 PROCEDURE — 90471 IMMUNIZATION ADMIN: CPT | Mod: S$GLB,,, | Performed by: FAMILY MEDICINE

## 2020-11-03 PROCEDURE — 99214 OFFICE O/P EST MOD 30 MIN: CPT | Mod: 25,S$GLB,, | Performed by: FAMILY MEDICINE

## 2020-11-03 PROCEDURE — 3075F SYST BP GE 130 - 139MM HG: CPT | Mod: CPTII,S$GLB,, | Performed by: FAMILY MEDICINE

## 2020-11-03 PROCEDURE — 3079F DIAST BP 80-89 MM HG: CPT | Mod: CPTII,S$GLB,, | Performed by: FAMILY MEDICINE

## 2020-11-03 PROCEDURE — 90471 FLU VACCINE (QUAD) GREATER THAN OR EQUAL TO 3YO PRESERVATIVE FREE IM: ICD-10-PCS | Mod: S$GLB,,, | Performed by: FAMILY MEDICINE

## 2020-11-03 PROCEDURE — 90686 IIV4 VACC NO PRSV 0.5 ML IM: CPT | Mod: S$GLB,,, | Performed by: FAMILY MEDICINE

## 2020-11-03 PROCEDURE — 90686 FLU VACCINE (QUAD) GREATER THAN OR EQUAL TO 3YO PRESERVATIVE FREE IM: ICD-10-PCS | Mod: S$GLB,,, | Performed by: FAMILY MEDICINE

## 2020-11-03 PROCEDURE — 3079F PR MOST RECENT DIASTOLIC BLOOD PRESSURE 80-89 MM HG: ICD-10-PCS | Mod: CPTII,S$GLB,, | Performed by: FAMILY MEDICINE

## 2020-11-03 PROCEDURE — 3008F BODY MASS INDEX DOCD: CPT | Mod: CPTII,S$GLB,, | Performed by: FAMILY MEDICINE

## 2020-11-03 PROCEDURE — 3075F PR MOST RECENT SYSTOLIC BLOOD PRESS GE 130-139MM HG: ICD-10-PCS | Mod: CPTII,S$GLB,, | Performed by: FAMILY MEDICINE

## 2020-11-03 RX ORDER — BUPROPION HYDROCHLORIDE 150 MG/1
150 TABLET ORAL DAILY
Qty: 30 TABLET | Refills: 11 | Status: SHIPPED | OUTPATIENT
Start: 2020-11-03 | End: 2021-09-07

## 2020-11-03 RX ORDER — IBUPROFEN 800 MG/1
800 TABLET ORAL 3 TIMES DAILY
Qty: 21 TABLET | Refills: 0 | Status: SHIPPED | OUTPATIENT
Start: 2020-11-03 | End: 2021-01-29 | Stop reason: SDUPTHER

## 2020-11-03 RX ORDER — CITALOPRAM 20 MG/1
TABLET, FILM COATED ORAL
Qty: 45 TABLET | Refills: 11 | Status: SHIPPED | OUTPATIENT
Start: 2020-11-03 | End: 2021-11-03

## 2020-11-03 RX ORDER — TIZANIDINE 4 MG/1
4 TABLET ORAL EVERY 6 HOURS PRN
Qty: 30 TABLET | Refills: 0 | Status: SHIPPED | OUTPATIENT
Start: 2020-11-03 | End: 2020-11-13

## 2020-11-03 RX ORDER — HYDROCHLOROTHIAZIDE 25 MG/1
25 TABLET ORAL DAILY
Qty: 30 TABLET | Refills: 11 | Status: SHIPPED | OUTPATIENT
Start: 2020-11-03 | End: 2021-09-07

## 2020-11-03 NOTE — PROGRESS NOTES
Subjective:       Patient ID: Selam Bird is a 54 y.o. female.    Chief Complaint:   Chief Complaint   Patient presents with    Back Pain           History of depression:  Was hospitalized in 2016 for suicidal ideations at that time she was put on wellbutrin and celexa.  She has been on these since then.  No Suicidal thoughts.  Mood is good.  Tolerates her medication well.  Taking celexa plus wellbutrin.  Feels that symptoms are currently well controlled.         Hypertension  This is a chronic problem. The current episode started more than 1 month ago (need a refll on HCTZ she has two left). The problem is unchanged. The problem is uncontrolled. Pertinent negatives include no anxiety, chest pain, headaches, neck pain, palpitations, peripheral edema, shortness of breath or sweats. There are no associated agents to hypertension. Risk factors for coronary artery disease include sedentary lifestyle and obesity. Treatments tried: HCTZ. There is no history of angina, kidney disease, CAD/MI, CVA, heart failure, left ventricular hypertrophy or retinopathy.   Back Pain  This is a new problem. The current episode started 1 to 4 weeks ago. The problem occurs intermittently. The problem has been waxing and waning since onset. The pain is present in the lumbar spine and sacro-iliac. The quality of the pain is described as aching. The pain does not radiate. The pain is moderate. The pain is worse during the night. The symptoms are aggravated by bending and twisting. Pertinent negatives include no abdominal pain, bladder incontinence, bowel incontinence, chest pain, dysuria, fever, headaches, leg pain, numbness, paresis, paresthesias, pelvic pain, perianal numbness, tingling, weakness or weight loss. Risk factors include lack of exercise, menopause, obesity and sedentary lifestyle. She has tried nothing for the symptoms.     Review of Systems   Constitutional: Negative for activity change, appetite change, chills, fatigue, fever  and weight loss.   HENT: Negative for congestion, ear discharge, ear pain, rhinorrhea, sinus pain, sore throat and trouble swallowing.    Eyes: Negative for photophobia, pain, redness, itching and visual disturbance.   Respiratory: Negative for cough, chest tightness, shortness of breath and wheezing.    Cardiovascular: Negative for chest pain, palpitations and leg swelling.   Gastrointestinal: Negative for abdominal distention, abdominal pain, blood in stool, bowel incontinence, diarrhea, nausea and vomiting.   Genitourinary: Negative for bladder incontinence, dysuria, pelvic pain, vaginal bleeding, vaginal discharge and vaginal pain.   Musculoskeletal: Negative for arthralgias, back pain, gait problem and neck pain.   Skin: Negative for color change, pallor and rash.   Neurological: Negative for dizziness, tingling, tremors, weakness, light-headedness, numbness, headaches and paresthesias.   Psychiatric/Behavioral: Negative for agitation, behavioral problems, confusion and sleep disturbance.       Past Medical History:   Diagnosis Date    Depression     HTN (hypertension)     Hypertension      Social History     Socioeconomic History    Marital status:      Spouse name: Not on file    Number of children: Not on file    Years of education: Not on file    Highest education level: Not on file   Occupational History    Not on file   Social Needs    Financial resource strain: Not on file    Food insecurity     Worry: Not on file     Inability: Not on file    Transportation needs     Medical: Not on file     Non-medical: Not on file   Tobacco Use    Smoking status: Current Every Day Smoker     Packs/day: 1.00    Smokeless tobacco: Never Used   Substance and Sexual Activity    Alcohol use: Yes     Comment: seldom    Drug use: No    Sexual activity: Yes   Lifestyle    Physical activity     Days per week: Not on file     Minutes per session: Not on file    Stress: Not at all   Relationships     Social connections     Talks on phone: Not on file     Gets together: Not on file     Attends Taoism service: Not on file     Active member of club or organization: Not on file     Attends meetings of clubs or organizations: Not on file     Relationship status: Not on file   Other Topics Concern    Patient feels they ought to cut down on drinking/drug use Not Asked    Patient annoyed by others criticizing their drinking/drug use Not Asked    Patient has felt bad or guilty about drinking/drug use Not Asked    Patient has had a drink/used drugs as an eye opener in the AM Not Asked   Social History Narrative    Not on file       Objective:     There were no vitals taken for this visit.    Physical Exam  Constitutional:       Appearance: She is well-developed.   HENT:      Head: Normocephalic.   Eyes:      Conjunctiva/sclera: Conjunctivae normal.   Neck:      Musculoskeletal: Normal range of motion and neck supple.   Cardiovascular:      Rate and Rhythm: Normal rate and regular rhythm.      Heart sounds: Normal heart sounds.   Pulmonary:      Effort: Pulmonary effort is normal.      Breath sounds: Normal breath sounds.   Musculoskeletal:      Lumbar back: She exhibits tenderness. She exhibits normal range of motion, no swelling, no edema, no deformity, no pain and no spasm.        Back:    Skin:     General: Skin is warm and dry.   Neurological:      Mental Status: She is alert.   Psychiatric:         Behavior: Behavior normal.          ASSESSMENT:    Acute right-sided low back pain without sciatica  -     ibuprofen (ADVIL,MOTRIN) 800 MG tablet; Take 1 tablet (800 mg total) by mouth 3 (three) times daily.  Dispense: 21 tablet; Refill: 0  -     tiZANidine (ZANAFLEX) 4 MG tablet; Take 1 tablet (4 mg total) by mouth every 6 (six) hours as needed.  Dispense: 30 tablet; Refill: 0  -     Comprehensive Metabolic Panel; Future; Expected date: 11/03/2020  -     CBC Auto Differential; Future; Expected date:  11/03/2020  - Exercises discussed.     Essential hypertension  -     hydroCHLOROthiazide (HYDRODIURIL) 25 MG tablet; Take 1 tablet (25 mg total) by mouth once daily.  Dispense: 30 tablet; Refill: 11    MDD (major depressive disorder), recurrent severe, without psychosis  -     buPROPion (WELLBUTRIN XL) 150 MG TB24 tablet; Take 1 tablet (150 mg total) by mouth once daily.  Dispense: 30 tablet; Refill: 11  -     citalopram (CELEXA) 20 MG tablet; Take 1.5 tablets daily  Dispense: 45 tablet; Refill: 11    Need for influenza vaccination  -     Influenza - Quadrivalent *Preferred* (6 months+) (PF)    Lipid screening  -     Lipid Panel; Future; Expected date: 11/03/2020

## 2020-11-11 ENCOUNTER — LAB VISIT (OUTPATIENT)
Dept: LAB | Facility: HOSPITAL | Age: 55
End: 2020-11-11
Attending: FAMILY MEDICINE
Payer: COMMERCIAL

## 2020-11-11 DIAGNOSIS — Z13.220 LIPID SCREENING: ICD-10-CM

## 2020-11-11 DIAGNOSIS — M54.50 ACUTE RIGHT-SIDED LOW BACK PAIN WITHOUT SCIATICA: ICD-10-CM

## 2020-11-11 LAB
ALBUMIN SERPL BCP-MCNC: 3.9 G/DL (ref 3.5–5.2)
ALP SERPL-CCNC: 37 U/L (ref 38–126)
ALT SERPL W/O P-5'-P-CCNC: 23 U/L (ref 10–44)
ANION GAP SERPL CALC-SCNC: 3 MMOL/L (ref 8–16)
AST SERPL-CCNC: 31 U/L (ref 15–46)
BASOPHILS # BLD AUTO: 0.06 K/UL (ref 0–0.2)
BASOPHILS NFR BLD: 0.7 % (ref 0–1.9)
BILIRUB SERPL-MCNC: 0.6 MG/DL (ref 0.1–1)
BUN SERPL-MCNC: 17 MG/DL (ref 7–17)
CALCIUM SERPL-MCNC: 9.5 MG/DL (ref 8.7–10.5)
CHLORIDE SERPL-SCNC: 108 MMOL/L (ref 95–110)
CHOLEST SERPL-MCNC: 251 MG/DL (ref 120–199)
CHOLEST/HDLC SERPL: 5.3 {RATIO} (ref 2–5)
CO2 SERPL-SCNC: 29 MMOL/L (ref 23–29)
CREAT SERPL-MCNC: 0.78 MG/DL (ref 0.5–1.4)
DIFFERENTIAL METHOD: ABNORMAL
EOSINOPHIL # BLD AUTO: 0.1 K/UL (ref 0–0.5)
EOSINOPHIL NFR BLD: 1.2 % (ref 0–8)
ERYTHROCYTE [DISTWIDTH] IN BLOOD BY AUTOMATED COUNT: 13 % (ref 11.5–14.5)
EST. GFR  (AFRICAN AMERICAN): >60 ML/MIN/1.73 M^2
EST. GFR  (NON AFRICAN AMERICAN): >60 ML/MIN/1.73 M^2
GLUCOSE SERPL-MCNC: 97 MG/DL (ref 70–110)
HCT VFR BLD AUTO: 42.3 % (ref 37–48.5)
HDLC SERPL-MCNC: 47 MG/DL (ref 40–75)
HDLC SERPL: 18.7 % (ref 20–50)
HGB BLD-MCNC: 13.2 G/DL (ref 12–16)
IMM GRANULOCYTES # BLD AUTO: 0.03 K/UL (ref 0–0.04)
IMM GRANULOCYTES NFR BLD AUTO: 0.3 % (ref 0–0.5)
LDLC SERPL CALC-MCNC: 167.6 MG/DL (ref 63–159)
LYMPHOCYTES # BLD AUTO: 2.7 K/UL (ref 1–4.8)
LYMPHOCYTES NFR BLD: 30.3 % (ref 18–48)
MCH RBC QN AUTO: 29.6 PG (ref 27–31)
MCHC RBC AUTO-ENTMCNC: 31.2 G/DL (ref 32–36)
MCV RBC AUTO: 95 FL (ref 82–98)
MONOCYTES # BLD AUTO: 0.5 K/UL (ref 0.3–1)
MONOCYTES NFR BLD: 5.1 % (ref 4–15)
NEUTROPHILS # BLD AUTO: 5.5 K/UL (ref 1.8–7.7)
NEUTROPHILS NFR BLD: 62.4 % (ref 38–73)
NONHDLC SERPL-MCNC: 204 MG/DL
NRBC BLD-RTO: 0 /100 WBC
PLATELET # BLD AUTO: 241 K/UL (ref 150–350)
PMV BLD AUTO: 12.2 FL (ref 9.2–12.9)
POTASSIUM SERPL-SCNC: 4.9 MMOL/L (ref 3.5–5.1)
PROT SERPL-MCNC: 6.5 G/DL (ref 6–8.4)
RBC # BLD AUTO: 4.46 M/UL (ref 4–5.4)
SODIUM SERPL-SCNC: 140 MMOL/L (ref 136–145)
TRIGL SERPL-MCNC: 182 MG/DL (ref 30–150)
WBC # BLD AUTO: 8.82 K/UL (ref 3.9–12.7)

## 2020-11-11 PROCEDURE — 36415 COLL VENOUS BLD VENIPUNCTURE: CPT | Mod: PO

## 2020-11-11 PROCEDURE — 80061 LIPID PANEL: CPT

## 2020-11-11 PROCEDURE — 80053 COMPREHEN METABOLIC PANEL: CPT | Mod: PO

## 2020-11-11 PROCEDURE — 85025 COMPLETE CBC W/AUTO DIFF WBC: CPT | Mod: PO

## 2020-11-13 DIAGNOSIS — E78.5 HYPERLIPIDEMIA, UNSPECIFIED HYPERLIPIDEMIA TYPE: Primary | ICD-10-CM

## 2020-11-13 RX ORDER — ATORVASTATIN CALCIUM 20 MG/1
20 TABLET, FILM COATED ORAL DAILY
Qty: 90 TABLET | Refills: 3 | Status: SHIPPED | OUTPATIENT
Start: 2020-11-13 | End: 2021-11-04

## 2020-11-25 ENCOUNTER — OFFICE VISIT (OUTPATIENT)
Dept: FAMILY MEDICINE | Facility: CLINIC | Age: 55
End: 2020-11-25
Payer: COMMERCIAL

## 2020-11-25 ENCOUNTER — TELEPHONE (OUTPATIENT)
Dept: FAMILY MEDICINE | Facility: CLINIC | Age: 55
End: 2020-11-25

## 2020-11-25 VITALS
OXYGEN SATURATION: 95 % | BODY MASS INDEX: 41.84 KG/M2 | SYSTOLIC BLOOD PRESSURE: 118 MMHG | HEART RATE: 66 BPM | HEIGHT: 65 IN | WEIGHT: 251.13 LBS | DIASTOLIC BLOOD PRESSURE: 72 MMHG | TEMPERATURE: 98 F

## 2020-11-25 DIAGNOSIS — L02.91 ABSCESS: Primary | ICD-10-CM

## 2020-11-25 PROCEDURE — 3008F PR BODY MASS INDEX (BMI) DOCUMENTED: ICD-10-PCS | Mod: CPTII,S$GLB,, | Performed by: FAMILY MEDICINE

## 2020-11-25 PROCEDURE — 10060 I&D ABSCESS SIMPLE/SINGLE: CPT | Mod: S$GLB,,, | Performed by: FAMILY MEDICINE

## 2020-11-25 PROCEDURE — 1126F PR PAIN SEVERITY QUANTIFIED, NO PAIN PRESENT: ICD-10-PCS | Mod: S$GLB,,, | Performed by: FAMILY MEDICINE

## 2020-11-25 PROCEDURE — 3074F SYST BP LT 130 MM HG: CPT | Mod: CPTII,S$GLB,, | Performed by: FAMILY MEDICINE

## 2020-11-25 PROCEDURE — 3008F BODY MASS INDEX DOCD: CPT | Mod: CPTII,S$GLB,, | Performed by: FAMILY MEDICINE

## 2020-11-25 PROCEDURE — 1126F AMNT PAIN NOTED NONE PRSNT: CPT | Mod: S$GLB,,, | Performed by: FAMILY MEDICINE

## 2020-11-25 PROCEDURE — 3078F DIAST BP <80 MM HG: CPT | Mod: CPTII,S$GLB,, | Performed by: FAMILY MEDICINE

## 2020-11-25 PROCEDURE — 3078F PR MOST RECENT DIASTOLIC BLOOD PRESSURE < 80 MM HG: ICD-10-PCS | Mod: CPTII,S$GLB,, | Performed by: FAMILY MEDICINE

## 2020-11-25 PROCEDURE — 3074F PR MOST RECENT SYSTOLIC BLOOD PRESSURE < 130 MM HG: ICD-10-PCS | Mod: CPTII,S$GLB,, | Performed by: FAMILY MEDICINE

## 2020-11-25 PROCEDURE — 10060 INCISION & DRAINAGE: ICD-10-PCS | Mod: S$GLB,,, | Performed by: FAMILY MEDICINE

## 2020-11-25 PROCEDURE — 99213 OFFICE O/P EST LOW 20 MIN: CPT | Mod: 25,S$GLB,, | Performed by: FAMILY MEDICINE

## 2020-11-25 PROCEDURE — 99213 PR OFFICE/OUTPT VISIT, EST, LEVL III, 20-29 MIN: ICD-10-PCS | Mod: 25,S$GLB,, | Performed by: FAMILY MEDICINE

## 2020-11-25 RX ORDER — SULFAMETHOXAZOLE AND TRIMETHOPRIM 800; 160 MG/1; MG/1
1 TABLET ORAL 2 TIMES DAILY
Qty: 14 TABLET | Refills: 0 | Status: SHIPPED | OUTPATIENT
Start: 2020-11-25 | End: 2021-01-29

## 2020-11-25 NOTE — PROGRESS NOTES
Subjective:       Patient ID: Selam Bird is a 54 y.o. female.    Chief Complaint: Abscess (bikini line)    Abscess  Chronicity:  NewProgression Since Onset: unchanged  Location:  Torso (left bikini line)  Associated Symptoms: no fever, no chills, no sweats  Characteristics: draining, painful and redness    Characteristics: no itching, no dryness, no scaling, no peeling, no swelling, no bruising and no blistering    Treatments Tried:  Topical antibiotics  Relieved by:  Nothing  Worsened by:  Nothing    Review of Systems   Constitutional: Negative for activity change, appetite change, chills, fatigue and fever.   HENT: Negative for nasal congestion, ear discharge, ear pain, rhinorrhea, sore throat and trouble swallowing.    Eyes: Negative for photophobia, pain, redness, itching and visual disturbance.   Respiratory: Negative for cough, chest tightness, shortness of breath and wheezing.    Cardiovascular: Negative for chest pain, palpitations and leg swelling.   Gastrointestinal: Negative for abdominal distention, abdominal pain, blood in stool, diarrhea, nausea and vomiting.   Genitourinary: Negative for dysuria, pelvic pain, vaginal bleeding, vaginal discharge and vaginal pain.   Musculoskeletal: Negative for arthralgias, back pain, gait problem and neck pain.   Integumentary:  Negative for color change, pallor and rash.   Neurological: Negative for dizziness, tremors, weakness, light-headedness, numbness and headaches.   Psychiatric/Behavioral: Negative for agitation, behavioral problems, confusion and sleep disturbance.         Objective:      Physical Exam  Constitutional:       Appearance: She is well-developed.   HENT:      Head: Normocephalic.   Eyes:      Conjunctiva/sclera: Conjunctivae normal.   Neck:      Musculoskeletal: Normal range of motion and neck supple.   Cardiovascular:      Rate and Rhythm: Normal rate and regular rhythm.      Heart sounds: Normal heart sounds.   Pulmonary:      Effort: Pulmonary  effort is normal.      Breath sounds: Normal breath sounds.   Skin:     General: Skin is warm and dry.          Neurological:      Mental Status: She is alert.   Psychiatric:         Behavior: Behavior normal.         Assessment:       1. Abscess        Plan:       Abscess  -     sulfamethoxazole-trimethoprim 800-160mg (BACTRIM DS) 800-160 mg Tab; Take 1 tablet by mouth 2 (two) times daily.  Dispense: 14 tablet; Refill: 0

## 2020-11-25 NOTE — TELEPHONE ENCOUNTER
----- Message from Trang Rincon sent at 11/25/2020  2:18 PM CST -----  Contact: [phone]/self  Who Called: PT  Regarding: pt was seen today and is still have pain and discomfort from boil   Would the patient rather a call back or a response via [facility]? Call back  Best Call Back Number: [phone]  Additional Information:

## 2020-11-25 NOTE — PROCEDURES
"Incision & Drainage    Date/Time: 11/25/2020 8:20 AM  Performed by: Lexi Lincoln DO  Authorized by: Lexi Lincoln DO     Time out: Immediately prior to procedure a "time out" was called to verify the correct patient, procedure, equipment, support staff and site/side marked as required.    Consent Done?:  Yes (Verbal)    Type:  Abscess  Body area:  Trunk  Location details:  Abdomen  Anesthesia:  Local infiltration  Local anesthetic: lidocaine 2% with epinephrine  Scalpel size:  11  Incision type:  Single straight  Complexity:  Simple  Drainage:  Pus and serosanguinous  Drainage amount:  Scant  Wound treatment:  Wound left open  Packing material:  None  Patient tolerance:  Patient tolerated the procedure well with no immediate complications      "

## 2020-11-25 NOTE — TELEPHONE ENCOUNTER
Spoke with patient and instructed to  bactrim. Patient wants f/u appt scheduled in event abscess will not drain. Informed can cancel appt if not needed. Patient voices understanding.

## 2021-01-27 ENCOUNTER — TELEPHONE (OUTPATIENT)
Dept: FAMILY MEDICINE | Facility: CLINIC | Age: 56
End: 2021-01-27

## 2021-01-29 ENCOUNTER — OFFICE VISIT (OUTPATIENT)
Dept: FAMILY MEDICINE | Facility: CLINIC | Age: 56
End: 2021-01-29
Payer: COMMERCIAL

## 2021-01-29 VITALS
TEMPERATURE: 98 F | SYSTOLIC BLOOD PRESSURE: 150 MMHG | BODY MASS INDEX: 43.14 KG/M2 | OXYGEN SATURATION: 95 % | WEIGHT: 258.94 LBS | DIASTOLIC BLOOD PRESSURE: 78 MMHG | HEART RATE: 66 BPM | HEIGHT: 65 IN

## 2021-01-29 DIAGNOSIS — G89.29 CHRONIC RIGHT SHOULDER PAIN: Primary | ICD-10-CM

## 2021-01-29 DIAGNOSIS — M54.50 ACUTE RIGHT-SIDED LOW BACK PAIN WITHOUT SCIATICA: ICD-10-CM

## 2021-01-29 DIAGNOSIS — M25.511 CHRONIC RIGHT SHOULDER PAIN: Primary | ICD-10-CM

## 2021-01-29 DIAGNOSIS — M25.519 SHOULDER PAIN, UNSPECIFIED CHRONICITY, UNSPECIFIED LATERALITY: ICD-10-CM

## 2021-01-29 PROCEDURE — 99213 OFFICE O/P EST LOW 20 MIN: CPT | Mod: 25,S$GLB,, | Performed by: FAMILY MEDICINE

## 2021-01-29 PROCEDURE — 3078F DIAST BP <80 MM HG: CPT | Mod: CPTII,S$GLB,, | Performed by: FAMILY MEDICINE

## 2021-01-29 PROCEDURE — 3008F BODY MASS INDEX DOCD: CPT | Mod: CPTII,S$GLB,, | Performed by: FAMILY MEDICINE

## 2021-01-29 PROCEDURE — 3078F PR MOST RECENT DIASTOLIC BLOOD PRESSURE < 80 MM HG: ICD-10-PCS | Mod: CPTII,S$GLB,, | Performed by: FAMILY MEDICINE

## 2021-01-29 PROCEDURE — 3077F SYST BP >= 140 MM HG: CPT | Mod: CPTII,S$GLB,, | Performed by: FAMILY MEDICINE

## 2021-01-29 PROCEDURE — 20610 INTERMEDIATE JOINT ASPIRATION/INJECTION: R ACROMIOCLAVICULAR: ICD-10-PCS | Mod: S$GLB,,, | Performed by: FAMILY MEDICINE

## 2021-01-29 PROCEDURE — 1125F PR PAIN SEVERITY QUANTIFIED, PAIN PRESENT: ICD-10-PCS | Mod: S$GLB,,, | Performed by: FAMILY MEDICINE

## 2021-01-29 PROCEDURE — 20610 DRAIN/INJ JOINT/BURSA W/O US: CPT | Mod: S$GLB,,, | Performed by: FAMILY MEDICINE

## 2021-01-29 PROCEDURE — 3077F PR MOST RECENT SYSTOLIC BLOOD PRESSURE >= 140 MM HG: ICD-10-PCS | Mod: CPTII,S$GLB,, | Performed by: FAMILY MEDICINE

## 2021-01-29 PROCEDURE — 3008F PR BODY MASS INDEX (BMI) DOCUMENTED: ICD-10-PCS | Mod: CPTII,S$GLB,, | Performed by: FAMILY MEDICINE

## 2021-01-29 PROCEDURE — 99213 PR OFFICE/OUTPT VISIT, EST, LEVL III, 20-29 MIN: ICD-10-PCS | Mod: 25,S$GLB,, | Performed by: FAMILY MEDICINE

## 2021-01-29 PROCEDURE — 1125F AMNT PAIN NOTED PAIN PRSNT: CPT | Mod: S$GLB,,, | Performed by: FAMILY MEDICINE

## 2021-01-29 RX ORDER — IBUPROFEN 800 MG/1
800 TABLET ORAL 3 TIMES DAILY
Qty: 21 TABLET | Refills: 0 | Status: SHIPPED | OUTPATIENT
Start: 2021-01-29 | End: 2021-02-04

## 2021-01-29 RX ORDER — TRIAMCINOLONE ACETONIDE 40 MG/ML
40 INJECTION, SUSPENSION INTRA-ARTICULAR; INTRAMUSCULAR
Status: DISCONTINUED | OUTPATIENT
Start: 2021-01-29 | End: 2021-01-29 | Stop reason: HOSPADM

## 2021-01-29 RX ADMIN — TRIAMCINOLONE ACETONIDE 40 MG: 40 INJECTION, SUSPENSION INTRA-ARTICULAR; INTRAMUSCULAR at 11:01

## 2021-02-01 ENCOUNTER — TELEPHONE (OUTPATIENT)
Dept: FAMILY MEDICINE | Facility: CLINIC | Age: 56
End: 2021-02-01

## 2021-02-03 ENCOUNTER — HOSPITAL ENCOUNTER (OUTPATIENT)
Dept: RADIOLOGY | Facility: HOSPITAL | Age: 56
Discharge: HOME | End: 2021-02-03
Attending: FAMILY MEDICINE
Payer: COMMERCIAL

## 2021-02-03 DIAGNOSIS — M25.519 SHOULDER PAIN, UNSPECIFIED CHRONICITY, UNSPECIFIED LATERALITY: ICD-10-CM

## 2021-02-03 PROCEDURE — 73221 MRI JOINT UPR EXTREM W/O DYE: CPT | Mod: TC,PO,RT

## 2021-04-06 ENCOUNTER — TELEPHONE (OUTPATIENT)
Dept: FAMILY MEDICINE | Facility: CLINIC | Age: 56
End: 2021-04-06

## 2021-04-06 DIAGNOSIS — K04.7 TOOTH ABSCESS: Primary | ICD-10-CM

## 2021-04-06 RX ORDER — AMOXICILLIN 875 MG/1
875 TABLET, FILM COATED ORAL EVERY 12 HOURS
Qty: 14 TABLET | Refills: 0 | Status: SHIPPED | OUTPATIENT
Start: 2021-04-06 | End: 2021-11-03

## 2021-04-20 ENCOUNTER — PATIENT OUTREACH (OUTPATIENT)
Dept: ADMINISTRATIVE | Facility: HOSPITAL | Age: 56
End: 2021-04-20

## 2021-04-21 DIAGNOSIS — Z12.31 OTHER SCREENING MAMMOGRAM: ICD-10-CM

## 2021-06-07 ENCOUNTER — PATIENT OUTREACH (OUTPATIENT)
Dept: ADMINISTRATIVE | Facility: HOSPITAL | Age: 56
End: 2021-06-07

## 2021-07-22 DIAGNOSIS — Z12.11 COLON CANCER SCREENING: ICD-10-CM

## 2021-10-18 ENCOUNTER — TELEPHONE (OUTPATIENT)
Dept: FAMILY MEDICINE | Facility: CLINIC | Age: 56
End: 2021-10-18

## 2021-10-26 ENCOUNTER — TELEPHONE (OUTPATIENT)
Dept: FAMILY MEDICINE | Facility: CLINIC | Age: 56
End: 2021-10-26
Payer: COMMERCIAL

## 2021-10-26 DIAGNOSIS — Z00.00 ANNUAL PHYSICAL EXAM: Primary | ICD-10-CM

## 2021-10-29 ENCOUNTER — TELEPHONE (OUTPATIENT)
Dept: FAMILY MEDICINE | Facility: CLINIC | Age: 56
End: 2021-10-29
Payer: COMMERCIAL

## 2021-10-29 ENCOUNTER — TELEPHONE (OUTPATIENT)
Dept: INTERNAL MEDICINE | Facility: CLINIC | Age: 56
End: 2021-10-29
Payer: COMMERCIAL

## 2021-11-02 ENCOUNTER — TELEPHONE (OUTPATIENT)
Dept: FAMILY MEDICINE | Facility: CLINIC | Age: 56
End: 2021-11-02
Payer: COMMERCIAL

## 2021-11-03 ENCOUNTER — OFFICE VISIT (OUTPATIENT)
Dept: FAMILY MEDICINE | Facility: CLINIC | Age: 56
End: 2021-11-03
Payer: COMMERCIAL

## 2021-11-03 VITALS
BODY MASS INDEX: 44.98 KG/M2 | HEART RATE: 58 BPM | OXYGEN SATURATION: 99 % | DIASTOLIC BLOOD PRESSURE: 89 MMHG | HEIGHT: 65 IN | WEIGHT: 270 LBS | TEMPERATURE: 98 F | SYSTOLIC BLOOD PRESSURE: 139 MMHG

## 2021-11-03 DIAGNOSIS — I10 ESSENTIAL HYPERTENSION: ICD-10-CM

## 2021-11-03 DIAGNOSIS — M62.89 TENSOR FASCIA LATA SYNDROME: ICD-10-CM

## 2021-11-03 DIAGNOSIS — M70.61 TROCHANTERIC BURSITIS OF RIGHT HIP: Primary | ICD-10-CM

## 2021-11-03 DIAGNOSIS — M25.559 HIP PAIN: ICD-10-CM

## 2021-11-03 PROCEDURE — 1159F MED LIST DOCD IN RCRD: CPT | Mod: CPTII,S$GLB,, | Performed by: FAMILY MEDICINE

## 2021-11-03 PROCEDURE — 99214 OFFICE O/P EST MOD 30 MIN: CPT | Mod: S$GLB,,, | Performed by: FAMILY MEDICINE

## 2021-11-03 PROCEDURE — 3008F PR BODY MASS INDEX (BMI) DOCUMENTED: ICD-10-PCS | Mod: CPTII,S$GLB,, | Performed by: FAMILY MEDICINE

## 2021-11-03 PROCEDURE — 1159F PR MEDICATION LIST DOCUMENTED IN MEDICAL RECORD: ICD-10-PCS | Mod: CPTII,S$GLB,, | Performed by: FAMILY MEDICINE

## 2021-11-03 PROCEDURE — 1160F PR REVIEW ALL MEDS BY PRESCRIBER/CLIN PHARMACIST DOCUMENTED: ICD-10-PCS | Mod: CPTII,S$GLB,, | Performed by: FAMILY MEDICINE

## 2021-11-03 PROCEDURE — 1160F RVW MEDS BY RX/DR IN RCRD: CPT | Mod: CPTII,S$GLB,, | Performed by: FAMILY MEDICINE

## 2021-11-03 PROCEDURE — 3079F PR MOST RECENT DIASTOLIC BLOOD PRESSURE 80-89 MM HG: ICD-10-PCS | Mod: CPTII,S$GLB,, | Performed by: FAMILY MEDICINE

## 2021-11-03 PROCEDURE — 3075F PR MOST RECENT SYSTOLIC BLOOD PRESS GE 130-139MM HG: ICD-10-PCS | Mod: CPTII,S$GLB,, | Performed by: FAMILY MEDICINE

## 2021-11-03 PROCEDURE — 3075F SYST BP GE 130 - 139MM HG: CPT | Mod: CPTII,S$GLB,, | Performed by: FAMILY MEDICINE

## 2021-11-03 PROCEDURE — 99214 PR OFFICE/OUTPT VISIT, EST, LEVL IV, 30-39 MIN: ICD-10-PCS | Mod: S$GLB,,, | Performed by: FAMILY MEDICINE

## 2021-11-03 PROCEDURE — 3008F BODY MASS INDEX DOCD: CPT | Mod: CPTII,S$GLB,, | Performed by: FAMILY MEDICINE

## 2021-11-03 PROCEDURE — 3079F DIAST BP 80-89 MM HG: CPT | Mod: CPTII,S$GLB,, | Performed by: FAMILY MEDICINE

## 2021-11-03 RX ORDER — LOSARTAN POTASSIUM AND HYDROCHLOROTHIAZIDE 25; 100 MG/1; MG/1
1 TABLET ORAL DAILY
Qty: 90 TABLET | Refills: 3 | Status: SHIPPED | OUTPATIENT
Start: 2021-11-03 | End: 2022-10-28

## 2021-11-03 RX ORDER — METHYLPREDNISOLONE 4 MG/1
TABLET ORAL
Qty: 21 TABLET | Refills: 0 | Status: SHIPPED | OUTPATIENT
Start: 2021-11-03 | End: 2021-11-22

## 2021-11-03 RX ORDER — GABAPENTIN 300 MG/1
300 CAPSULE ORAL 3 TIMES DAILY
Qty: 90 CAPSULE | Refills: 11 | Status: SHIPPED | OUTPATIENT
Start: 2021-11-03 | End: 2021-11-22

## 2021-11-22 ENCOUNTER — TELEPHONE (OUTPATIENT)
Dept: FAMILY MEDICINE | Facility: CLINIC | Age: 56
End: 2021-11-22
Payer: COMMERCIAL

## 2021-11-22 ENCOUNTER — HOSPITAL ENCOUNTER (OUTPATIENT)
Dept: RADIOLOGY | Facility: HOSPITAL | Age: 56
Discharge: HOME | End: 2021-11-22
Attending: STUDENT IN AN ORGANIZED HEALTH CARE EDUCATION/TRAINING PROGRAM
Payer: COMMERCIAL

## 2021-11-22 ENCOUNTER — OFFICE VISIT (OUTPATIENT)
Dept: FAMILY MEDICINE | Facility: CLINIC | Age: 56
End: 2021-11-22
Payer: COMMERCIAL

## 2021-11-22 VITALS
WEIGHT: 276.13 LBS | TEMPERATURE: 98 F | OXYGEN SATURATION: 99 % | SYSTOLIC BLOOD PRESSURE: 150 MMHG | DIASTOLIC BLOOD PRESSURE: 80 MMHG | RESPIRATION RATE: 16 BRPM | BODY MASS INDEX: 46.01 KG/M2 | HEIGHT: 65 IN | HEART RATE: 66 BPM

## 2021-11-22 DIAGNOSIS — M79.651 RIGHT THIGH PAIN: ICD-10-CM

## 2021-11-22 DIAGNOSIS — M25.551 RIGHT HIP PAIN: ICD-10-CM

## 2021-11-22 DIAGNOSIS — M25.551 RIGHT HIP PAIN: Primary | ICD-10-CM

## 2021-11-22 PROCEDURE — 99214 PR OFFICE/OUTPT VISIT, EST, LEVL IV, 30-39 MIN: ICD-10-PCS | Mod: S$GLB,,, | Performed by: STUDENT IN AN ORGANIZED HEALTH CARE EDUCATION/TRAINING PROGRAM

## 2021-11-22 PROCEDURE — 73502 X-RAY EXAM HIP UNI 2-3 VIEWS: CPT | Mod: TC,FY,PO,RT

## 2021-11-22 PROCEDURE — 99214 OFFICE O/P EST MOD 30 MIN: CPT | Mod: S$GLB,,, | Performed by: STUDENT IN AN ORGANIZED HEALTH CARE EDUCATION/TRAINING PROGRAM

## 2021-11-24 ENCOUNTER — LAB VISIT (OUTPATIENT)
Dept: LAB | Facility: HOSPITAL | Age: 56
End: 2021-11-24
Attending: FAMILY MEDICINE
Payer: COMMERCIAL

## 2021-11-24 DIAGNOSIS — Z00.00 ANNUAL PHYSICAL EXAM: ICD-10-CM

## 2021-11-24 LAB
ALBUMIN SERPL BCP-MCNC: 3.9 G/DL (ref 3.5–5.2)
ALP SERPL-CCNC: 43 U/L (ref 38–126)
ALT SERPL W/O P-5'-P-CCNC: 32 U/L (ref 10–44)
ANION GAP SERPL CALC-SCNC: 6 MMOL/L (ref 8–16)
AST SERPL-CCNC: 28 U/L (ref 15–46)
BASOPHILS # BLD AUTO: 0.04 K/UL (ref 0–0.2)
BASOPHILS NFR BLD: 0.6 % (ref 0–1.9)
BILIRUB SERPL-MCNC: 0.7 MG/DL (ref 0.1–1)
CALCIUM SERPL-MCNC: 9.3 MG/DL (ref 8.7–10.5)
CHLORIDE SERPL-SCNC: 105 MMOL/L (ref 95–110)
CHOLEST SERPL-MCNC: 196 MG/DL (ref 120–199)
CHOLEST/HDLC SERPL: 4 {RATIO} (ref 2–5)
CO2 SERPL-SCNC: 31 MMOL/L (ref 23–29)
CREAT SERPL-MCNC: 0.75 MG/DL (ref 0.5–1.4)
DIFFERENTIAL METHOD: NORMAL
EOSINOPHIL # BLD AUTO: 0.1 K/UL (ref 0–0.5)
EOSINOPHIL NFR BLD: 1.6 % (ref 0–8)
ERYTHROCYTE [DISTWIDTH] IN BLOOD BY AUTOMATED COUNT: 12.7 % (ref 11.5–14.5)
EST. GFR  (AFRICAN AMERICAN): >60 ML/MIN/1.73 M^2
EST. GFR  (NON AFRICAN AMERICAN): >60 ML/MIN/1.73 M^2
GLUCOSE SERPL-MCNC: 110 MG/DL (ref 70–110)
HCT VFR BLD AUTO: 40.4 % (ref 37–48.5)
HDLC SERPL-MCNC: 49 MG/DL (ref 40–75)
HDLC SERPL: 25 % (ref 20–50)
HGB BLD-MCNC: 13.3 G/DL (ref 12–16)
IMM GRANULOCYTES # BLD AUTO: 0.02 K/UL (ref 0–0.04)
IMM GRANULOCYTES NFR BLD AUTO: 0.3 % (ref 0–0.5)
LDLC SERPL CALC-MCNC: 114.6 MG/DL (ref 63–159)
LYMPHOCYTES # BLD AUTO: 2.2 K/UL (ref 1–4.8)
LYMPHOCYTES NFR BLD: 36.4 % (ref 18–48)
MCH RBC QN AUTO: 30.5 PG (ref 27–31)
MCHC RBC AUTO-ENTMCNC: 32.9 G/DL (ref 32–36)
MCV RBC AUTO: 93 FL (ref 82–98)
MONOCYTES # BLD AUTO: 0.3 K/UL (ref 0.3–1)
MONOCYTES NFR BLD: 5.5 % (ref 4–15)
NEUTROPHILS # BLD AUTO: 3.4 K/UL (ref 1.8–7.7)
NEUTROPHILS NFR BLD: 55.6 % (ref 38–73)
NONHDLC SERPL-MCNC: 147 MG/DL
NRBC BLD-RTO: 0 /100 WBC
PLATELET # BLD AUTO: 214 K/UL (ref 150–450)
PMV BLD AUTO: 12.1 FL (ref 9.2–12.9)
POTASSIUM SERPL-SCNC: 3.9 MMOL/L (ref 3.5–5.1)
PROT SERPL-MCNC: 6.5 G/DL (ref 6–8.4)
RBC # BLD AUTO: 4.36 M/UL (ref 4–5.4)
SODIUM SERPL-SCNC: 142 MMOL/L (ref 136–145)
TRIGL SERPL-MCNC: 162 MG/DL (ref 30–150)
TSH SERPL DL<=0.005 MIU/L-ACNC: 2.63 UIU/ML (ref 0.4–4)
UUN UR-MCNC: 14 MG/DL (ref 7–17)
WBC # BLD AUTO: 6.16 K/UL (ref 3.9–12.7)

## 2021-11-24 PROCEDURE — 80053 COMPREHEN METABOLIC PANEL: CPT | Mod: PO | Performed by: FAMILY MEDICINE

## 2021-11-24 PROCEDURE — 85025 COMPLETE CBC W/AUTO DIFF WBC: CPT | Mod: PO | Performed by: FAMILY MEDICINE

## 2021-11-24 PROCEDURE — 84443 ASSAY THYROID STIM HORMONE: CPT | Mod: PO | Performed by: FAMILY MEDICINE

## 2021-11-24 PROCEDURE — 80061 LIPID PANEL: CPT | Performed by: FAMILY MEDICINE

## 2021-11-24 PROCEDURE — 36415 COLL VENOUS BLD VENIPUNCTURE: CPT | Mod: PO | Performed by: FAMILY MEDICINE

## 2021-11-26 ENCOUNTER — OFFICE VISIT (OUTPATIENT)
Dept: FAMILY MEDICINE | Facility: CLINIC | Age: 56
End: 2021-11-26
Payer: COMMERCIAL

## 2021-11-26 VITALS
HEART RATE: 90 BPM | OXYGEN SATURATION: 98 % | BODY MASS INDEX: 45.49 KG/M2 | SYSTOLIC BLOOD PRESSURE: 130 MMHG | TEMPERATURE: 98 F | WEIGHT: 273.06 LBS | DIASTOLIC BLOOD PRESSURE: 80 MMHG | HEIGHT: 65 IN

## 2021-11-26 DIAGNOSIS — F33.2 MDD (MAJOR DEPRESSIVE DISORDER), RECURRENT SEVERE, WITHOUT PSYCHOSIS: ICD-10-CM

## 2021-11-26 DIAGNOSIS — Z12.39 ENCOUNTER FOR SCREENING FOR MALIGNANT NEOPLASM OF BREAST, UNSPECIFIED SCREENING MODALITY: ICD-10-CM

## 2021-11-26 DIAGNOSIS — E78.5 HYPERLIPIDEMIA, UNSPECIFIED HYPERLIPIDEMIA TYPE: ICD-10-CM

## 2021-11-26 DIAGNOSIS — M79.651 RIGHT THIGH PAIN: ICD-10-CM

## 2021-11-26 DIAGNOSIS — I10 ESSENTIAL HYPERTENSION: Primary | ICD-10-CM

## 2021-11-26 DIAGNOSIS — M25.551 RIGHT HIP PAIN: ICD-10-CM

## 2021-11-26 PROCEDURE — 99396 PR PREVENTIVE VISIT,EST,40-64: ICD-10-PCS | Mod: S$GLB,,, | Performed by: FAMILY MEDICINE

## 2021-11-26 PROCEDURE — 99396 PREV VISIT EST AGE 40-64: CPT | Mod: S$GLB,,, | Performed by: FAMILY MEDICINE

## 2021-11-26 RX ORDER — HYDROCODONE BITARTRATE AND ACETAMINOPHEN 10; 325 MG/1; MG/1
1 TABLET ORAL EVERY 8 HOURS PRN
Qty: 30 TABLET | Refills: 0 | Status: SHIPPED | OUTPATIENT
Start: 2021-11-26

## 2022-03-01 DIAGNOSIS — F33.2 MDD (MAJOR DEPRESSIVE DISORDER), RECURRENT SEVERE, WITHOUT PSYCHOSIS: ICD-10-CM

## 2022-03-01 NOTE — TELEPHONE ENCOUNTER
No new care gaps identified.  Powered by [employer]. Reference number: [id number].   3/01/2022 3:14:08 AM CST

## 2022-03-08 NOTE — TELEPHONE ENCOUNTER
Refill Authorization Note   Selam Bird  is requesting a refill authorization.  Brief Assessment and Rationale for Refill:  Approve     Medication Therapy Plan:       Medication Reconciliation Completed: No   Comments:   --->Care Gap information included below if applicable.       Requested Prescriptions   Pending Prescriptions Disp Refills    citalopram (CELEXA) 20 MG tablet [Pharmacy Med Name: CITALOPRAM 20MG TABLETS] 135 tablet 2     Sig: TAKE 1 AND 1/2 TABLETS BY MOUTH DAILY       Psychiatry:  Antidepressants - SSRI Passed - 3/8/2022 10:42 AM        Passed - Patient is at least 18 years old        Passed - Valid encounter within last 15 months     Recent Visits  Date Type Provider Dept   11/26/21 Office Visit Lexi Lincoln,  [facility]   11/03/21 Office Visit Lexi Lincoln,  [facility]   01/29/21 Office Visit Lexi Lincoln,  [facility]   11/25/20 Office Visit Lexi Lincoln,  [facility]   11/03/20 Office Visit Lexi Lincoln DO [facility]   Showing recent visits within past 720 days and meeting all other requirements  Future Appointments  No visits were found meeting these conditions.  Showing future appointments within next 150 days and meeting all other requirements                    Appointments  past 12m or future 3m with PCP    Date Provider   Last Visit   11/26/2021 Lexi Lincoln DO   Next Visit   Visit date not found Lexi Lincoln DO   ED visits in past 90 days: 0     Note composed:10:44 AM 03/08/2022

## 2022-03-09 RX ORDER — CITALOPRAM 20 MG/1
TABLET, FILM COATED ORAL
Qty: 135 TABLET | Refills: 2 | Status: SHIPPED | OUTPATIENT
Start: 2022-03-09 | End: 2023-01-04

## 2023-01-01 DIAGNOSIS — F33.2 MDD (MAJOR DEPRESSIVE DISORDER), RECURRENT SEVERE, WITHOUT PSYCHOSIS: ICD-10-CM

## 2023-01-04 RX ORDER — CITALOPRAM 20 MG/1
TABLET, FILM COATED ORAL
Qty: 135 TABLET | Refills: 2 | Status: SHIPPED | OUTPATIENT
Start: 2023-01-04

## 2024-07-17 ENCOUNTER — CLINICAL SUPPORT (OUTPATIENT)
Dept: FAMILY MEDICINE | Facility: CLINIC | Age: 59
End: 2024-07-17

## 2024-07-17 DIAGNOSIS — Z00.00 ROUTINE ADULT HEALTH MAINTENANCE: Primary | ICD-10-CM

## 2024-07-17 NOTE — PROGRESS NOTES
Selam has presented today for Pre-Hire screening on behalf of [facility]'s Office. Selam Bird has completed Non-DOT Physical and Non-DOT Drug Screen .    Bety Gutierrez

## 2024-07-18 LAB
ALBUMIN SERPL-MCNC: NORMAL G/DL
ALBUMIN/GLOB SERPL: NORMAL (CALC)
ALP SERPL-CCNC: NORMAL U/L
ALT SERPL-CCNC: NORMAL U/L
AST SERPL-CCNC: NORMAL U/L
BASOPHILS # BLD AUTO: 62 CELLS/UL (ref 0–200)
BASOPHILS NFR BLD AUTO: 0.6 %
BILIRUB SERPL-MCNC: NORMAL MG/DL
BLASTS # BLD: NORMAL CELLS/UL
BLASTS NFR BLD MANUAL: NORMAL %
BUN SERPL-MCNC: NORMAL MG/DL
BUN/CREAT SERPL: NORMAL (CALC)
CALCIUM SERPL-MCNC: NORMAL MG/DL
CHLORIDE SERPL-SCNC: NORMAL MMOL/L
CHOLEST SERPL-MCNC: NORMAL MG/DL
CHOLEST/HDLC SERPL: NORMAL {RATIO}
CO2 SERPL-SCNC: NORMAL MMOL/L
CREAT SERPL-MCNC: NORMAL MG/DL
EGFR: NORMAL ML/MIN/1.73M2
EOSINOPHIL # BLD AUTO: 62 CELLS/UL (ref 15–500)
EOSINOPHIL NFR BLD AUTO: 0.6 %
ERYTHROCYTE [DISTWIDTH] IN BLOOD BY AUTOMATED COUNT: 12.7 % (ref 11–15)
GLOBULIN SER CALC-MCNC: NORMAL G/DL (CALC)
GLUCOSE SERPL-MCNC: NORMAL MG/DL
HCT VFR BLD AUTO: 39.4 % (ref 35–45)
HDLC SERPL-MCNC: NORMAL MG/DL
HGB BLD-MCNC: 12.8 G/DL (ref 11.7–15.5)
HIV 1+2 AB+HIV1 P24 AG SERPL QL IA: NORMAL
LDLC SERPL CALC-MCNC: NORMAL MG/DL
LYMPHOCYTES # BLD AUTO: 3224 CELLS/UL (ref 850–3900)
LYMPHOCYTES NFR BLD AUTO: 31.3 %
MCH RBC QN AUTO: 29.7 PG (ref 27–33)
MCHC RBC AUTO-ENTMCNC: 32.5 G/DL (ref 32–36)
MCV RBC AUTO: 91.4 FL (ref 80–100)
METAMYELOCYTES # BLD: NORMAL CELLS/UL
METAMYELOCYTES NFR BLD MANUAL: NORMAL %
MONOCYTES # BLD AUTO: 433 CELLS/UL (ref 200–950)
MONOCYTES NFR BLD AUTO: 4.2 %
MYELOCYTES # BLD: NORMAL CELLS/UL
MYELOCYTES NFR BLD MANUAL: NORMAL %
NEUTROPHILS # BLD AUTO: 6520 CELLS/UL (ref 1500–7800)
NEUTROPHILS NFR BLD AUTO: 63.3 %
NEUTS BAND # BLD: NORMAL CELLS/UL (ref 0–750)
NEUTS BAND NFR BLD MANUAL: NORMAL %
NONHDLC SERPL-MCNC: NORMAL MG/DL
NRBC # BLD: NORMAL CELLS/UL
NRBC BLD-RTO: NORMAL /100 WBC
PLATELET # BLD AUTO: 239 THOUSAND/UL (ref 140–400)
PMV BLD REES-ECKER: 12.3 FL (ref 7.5–12.5)
POTASSIUM SERPL-SCNC: NORMAL MMOL/L
PROMYELOCYTES # BLD: NORMAL CELLS/UL
PROMYELOCYTES NFR BLD MANUAL: NORMAL %
PROT SERPL-MCNC: NORMAL G/DL
RBC # BLD AUTO: 4.31 MILLION/UL (ref 3.8–5.1)
RPR SER QL: NORMAL
SERVICE CMNT-IMP: NORMAL
SODIUM SERPL-SCNC: NORMAL MMOL/L
TRIGL SERPL-MCNC: NORMAL MG/DL
VARIANT LYMPHS NFR BLD: NORMAL % (ref 0–10)
WBC # BLD AUTO: 10.3 THOUSAND/UL (ref 3.8–10.8)

## 2024-07-19 LAB
CLINICAL INFO: NORMAL
COMMENT: NORMAL
CYTO CVX: NORMAL
CYTOLOGIST CVX/VAG CYTO: NORMAL
CYTOLOGIST CVX/VAG CYTO: NORMAL
CYTOLOGY CMNT CVX/VAG CYTO-IMP: NORMAL
DATE OF PREVIOUS PAP: NORMAL
DATE PREVIOUS BX: NORMAL
GEN CATEG CVX/VAG CYTO-IMP: NORMAL
LMP START DATE: NORMAL
MICROORGANISM CVX/VAG CYTO: NORMAL
PATHOLOGIST CVX/VAG CYTO: NORMAL
SERVICE CMNT-IMP: NORMAL
SPECIMEN SOURCE CVX/VAG CYTO: NORMAL
STAT OF ADQ CVX/VAG CYTO-IMP: NORMAL

## 2024-07-22 LAB
ALBUMIN SERPL-MCNC: 4 G/DL (ref 3.6–5.1)
ALBUMIN/GLOB SERPL: 2 (CALC) (ref 1–2.5)
ALP SERPL-CCNC: 56 U/L (ref 37–153)
ALT SERPL-CCNC: 23 U/L (ref 6–29)
AST SERPL-CCNC: 20 U/L (ref 10–35)
BILIRUB SERPL-MCNC: 0.4 MG/DL (ref 0.2–1.2)
BUN SERPL-MCNC: 22 MG/DL (ref 7–25)
BUN/CREAT SERPL: ABNORMAL (CALC) (ref 6–22)
CALCIUM SERPL-MCNC: 9.8 MG/DL (ref 8.6–10.4)
CHLORIDE SERPL-SCNC: 104 MMOL/L (ref 98–110)
CHOLEST SERPL-MCNC: 184 MG/DL
CHOLEST/HDLC SERPL: 3.2 (CALC)
CO2 SERPL-SCNC: 30 MMOL/L (ref 20–32)
CREAT SERPL-MCNC: 1.02 MG/DL (ref 0.5–1.05)
EGFR: 63 ML/MIN/1.73M2
GLOBULIN SER CALC-MCNC: 2 G/DL (CALC) (ref 1.9–3.7)
GLUCOSE SERPL-MCNC: 102 MG/DL (ref 65–99)
HDLC SERPL-MCNC: 58 MG/DL
HIV 1+2 AB+HIV1 P24 AG SERPL QL IA: NORMAL
LDLC SERPL CALC-MCNC: 98 MG/DL (CALC)
NONHDLC SERPL-MCNC: 126 MG/DL (CALC)
POTASSIUM SERPL-SCNC: 3.8 MMOL/L (ref 3.5–5.3)
PROT SERPL-MCNC: 6 G/DL (ref 6.1–8.1)
RPR SER QL: NORMAL
SODIUM SERPL-SCNC: 147 MMOL/L (ref 135–146)
TRIGL SERPL-MCNC: 188 MG/DL

## 2024-10-02 ENCOUNTER — HOSPITAL ENCOUNTER (EMERGENCY)
Facility: HOSPITAL | Age: 59
Discharge: HOME | End: 2024-10-02
Attending: EMERGENCY MEDICINE
Payer: COMMERCIAL

## 2024-10-02 VITALS
BODY MASS INDEX: 46.65 KG/M2 | DIASTOLIC BLOOD PRESSURE: 71 MMHG | HEART RATE: 62 BPM | TEMPERATURE: 98 F | RESPIRATION RATE: 20 BRPM | SYSTOLIC BLOOD PRESSURE: 157 MMHG | HEIGHT: 65 IN | OXYGEN SATURATION: 96 % | WEIGHT: 280 LBS

## 2024-10-02 DIAGNOSIS — R07.9 CHEST PAIN: ICD-10-CM

## 2024-10-02 DIAGNOSIS — J40 BRONCHITIS: Primary | ICD-10-CM

## 2024-10-02 DIAGNOSIS — R05.9 COUGH, UNSPECIFIED TYPE: ICD-10-CM

## 2024-10-02 LAB
ALBUMIN SERPL BCP-MCNC: 4.3 G/DL (ref 3.5–5.2)
ALP SERPL-CCNC: 51 U/L (ref 38–126)
ALT SERPL W/O P-5'-P-CCNC: 36 U/L (ref 10–44)
ANION GAP SERPL CALC-SCNC: 8 MMOL/L (ref 8–16)
AST SERPL-CCNC: 33 U/L (ref 15–46)
BASOPHILS # BLD AUTO: 0.04 K/UL (ref 0–0.2)
BASOPHILS NFR BLD: 0.5 % (ref 0–1.9)
BILIRUB SERPL-MCNC: 0.7 MG/DL (ref 0.1–1)
CALCIUM SERPL-MCNC: 9.6 MG/DL (ref 8.7–10.5)
CHLORIDE SERPL-SCNC: 105 MMOL/L (ref 95–110)
CO2 SERPL-SCNC: 26 MMOL/L (ref 23–29)
CREAT SERPL-MCNC: 0.75 MG/DL (ref 0.5–1.4)
DIFFERENTIAL METHOD BLD: NORMAL
EOSINOPHIL # BLD AUTO: 0.1 K/UL (ref 0–0.5)
EOSINOPHIL NFR BLD: 0.8 % (ref 0–8)
ERYTHROCYTE [DISTWIDTH] IN BLOOD BY AUTOMATED COUNT: 12.8 % (ref 11.5–14.5)
EST. GFR  (NO RACE VARIABLE): >60 ML/MIN/1.73 M^2
GLUCOSE SERPL-MCNC: 99 MG/DL (ref 70–110)
HCT VFR BLD AUTO: 40.2 % (ref 37–48.5)
HGB BLD-MCNC: 13 G/DL (ref 12–16)
IMM GRANULOCYTES # BLD AUTO: 0.04 K/UL (ref 0–0.04)
IMM GRANULOCYTES NFR BLD AUTO: 0.5 % (ref 0–0.5)
LYMPHOCYTES # BLD AUTO: 3.2 K/UL (ref 1–4.8)
LYMPHOCYTES NFR BLD: 37.1 % (ref 18–48)
MCH RBC QN AUTO: 29.6 PG (ref 27–31)
MCHC RBC AUTO-ENTMCNC: 32.3 G/DL (ref 32–36)
MCV RBC AUTO: 92 FL (ref 82–98)
MONOCYTES # BLD AUTO: 0.5 K/UL (ref 0.3–1)
MONOCYTES NFR BLD: 5.4 % (ref 4–15)
NEUTROPHILS # BLD AUTO: 4.7 K/UL (ref 1.8–7.7)
NEUTROPHILS NFR BLD: 55.7 % (ref 38–73)
NRBC BLD-RTO: 0 /100 WBC
NT-PROBNP SERPL-MCNC: 64 PG/ML (ref 5–900)
OHS QRS DURATION: 104 MS
OHS QTC CALCULATION: 422 MS
PLATELET # BLD AUTO: 233 K/UL (ref 150–450)
PMV BLD AUTO: 11.3 FL (ref 9.2–12.9)
POTASSIUM SERPL-SCNC: 3.7 MMOL/L (ref 3.5–5.1)
PROT SERPL-MCNC: 6.9 G/DL (ref 6–8.4)
RBC # BLD AUTO: 4.39 M/UL (ref 4–5.4)
SODIUM SERPL-SCNC: 139 MMOL/L (ref 136–145)
TROPONIN I SERPL-MCNC: <0.012 NG/ML (ref 0.01–0.03)
UUN UR-MCNC: 17 MG/DL (ref 7–17)
WBC # BLD AUTO: 8.49 K/UL (ref 3.9–12.7)

## 2024-10-02 PROCEDURE — 84484 ASSAY OF TROPONIN QUANT: CPT | Mod: ER | Performed by: PHYSICIAN ASSISTANT

## 2024-10-02 PROCEDURE — 93010 ELECTROCARDIOGRAM REPORT: CPT | Mod: ,,, | Performed by: INTERNAL MEDICINE

## 2024-10-02 PROCEDURE — 80053 COMPREHEN METABOLIC PANEL: CPT | Mod: ER | Performed by: PHYSICIAN ASSISTANT

## 2024-10-02 PROCEDURE — 93005 ELECTROCARDIOGRAM TRACING: CPT | Mod: ER

## 2024-10-02 PROCEDURE — 85025 COMPLETE CBC W/AUTO DIFF WBC: CPT | Mod: ER | Performed by: PHYSICIAN ASSISTANT

## 2024-10-02 PROCEDURE — 99285 EMERGENCY DEPT VISIT HI MDM: CPT | Mod: 25,ER

## 2024-10-02 PROCEDURE — 83880 ASSAY OF NATRIURETIC PEPTIDE: CPT | Mod: ER | Performed by: PHYSICIAN ASSISTANT

## 2024-10-02 RX ORDER — BENZONATATE 100 MG/1
100 CAPSULE ORAL 3 TIMES DAILY PRN
Qty: 20 CAPSULE | Refills: 0 | Status: SHIPPED | OUTPATIENT
Start: 2024-10-02 | End: 2024-10-12

## 2024-10-02 RX ORDER — PREDNISONE 20 MG/1
40 TABLET ORAL DAILY
Qty: 8 TABLET | Refills: 0 | Status: SHIPPED | OUTPATIENT
Start: 2024-10-02 | End: 2024-10-07

## 2024-10-02 NOTE — DISCHARGE INSTRUCTIONS

## 2024-10-02 NOTE — ED PROVIDER NOTES
"Encounter Date: 10/2/2024       History     Chief Complaint   Patient presents with    Cough     Cough x 1 week. SOB that started with the cough. Pt reports "chest tightness and wheezing"     58-year-old female, PMH HTN, HLD, presents to ED with concern of cough that began roughly 1 week ago.  Initially, she did also have associated nasal congestion, sore throat and fever but reports the symptoms have since resolved.  Cough is dry.  She does report associated shortness of breath "during coughing spells" along with a midsternal chest pressure.  No current chest pain.  No lightheadedness or dizziness, palpitations, abdominal pain, nausea, vomiting, urinary or bowel complaints.  She has tried her home inhaler with no significant improvement.  She was a smoker.  No leg swelling.  No other acute complaints at this time    The history is provided by the patient.     Review of patient's allergies indicates:  No Known Allergies  Past Medical History:   Diagnosis Date    Depression     HTN (hypertension)     Hypertension      Past Surgical History:   Procedure Laterality Date    GALLBLADDER SURGERY      HYSTERECTOMY      TONSILLECTOMY      TUBAL LIGATION       No family history on file.  Social History     Tobacco Use    Smoking status: Every Day     Current packs/day: 1.00     Types: Cigarettes    Smokeless tobacco: Never   Substance Use Topics    Alcohol use: Yes     Comment: seldom    Drug use: No     Review of Systems   Constitutional:  Negative for appetite change, chills, fatigue and fever.   HENT:  Negative for congestion, ear pain and sore throat.    Respiratory:  Positive for cough and shortness of breath.    Cardiovascular:  Positive for chest pain. Negative for palpitations and leg swelling.   Gastrointestinal:  Negative for abdominal pain, diarrhea, nausea and vomiting.   Musculoskeletal:  Negative for myalgias, neck pain and neck stiffness.   Neurological:  Negative for dizziness and light-headedness. "       Physical Exam     Initial Vitals [10/02/24 0856]   BP Pulse Resp Temp SpO2   (!) 157/71 62 20 98 °F (36.7 °C) 96 %      MAP       --         Physical Exam    Nursing note and vitals reviewed.  Constitutional: She appears well-developed and well-nourished. She is cooperative. She does not have a sickly appearance. She does not appear ill. No distress.   HENT:   Head: Normocephalic and atraumatic.   Eyes: EOM are normal.   Neck: Neck supple.   Normal range of motion.  Cardiovascular:  Normal rate, regular rhythm and normal heart sounds.           Pulmonary/Chest: Breath sounds normal.   Speaking in full sentences with no labored breathing.  Lungs are clear bilaterally with no wheezing.  Occasional dry cough noted.   Abdominal: Abdomen is soft.   Musculoskeletal:         General: No tenderness.      Cervical back: Normal range of motion and neck supple.     Neurological: She is alert and oriented to person, place, and time. She is not disoriented. GCS eye subscore is 4. GCS verbal subscore is 5. GCS motor subscore is 6.   Skin: Skin is warm and dry.   Psychiatric: She has a normal mood and affect. Her speech is normal and behavior is normal. Thought content normal.         ED Course   Procedures  Labs Reviewed   CBC W/ AUTO DIFFERENTIAL       Result Value    WBC 8.49      RBC 4.39      Hemoglobin 13.0      Hematocrit 40.2      MCV 92      MCH 29.6      MCHC 32.3      RDW 12.8      Platelets 233      MPV 11.3      Immature Granulocytes 0.5      Gran # (ANC) 4.7      Immature Grans (Abs) 0.04      Lymph # 3.2      Mono # 0.5      Eos # 0.1      Baso # 0.04      nRBC 0      Gran % 55.7      Lymph % 37.1      Mono % 5.4      Eosinophil % 0.8      Basophil % 0.5      Differential Method Automated     COMPREHENSIVE METABOLIC PANEL    Sodium 139      Potassium 3.7      Chloride 105      CO2 26      Glucose 99      BUN 17      Creatinine 0.75      Calcium 9.6      Total Protein 6.9      Albumin 4.3      Total Bilirubin  0.7      Alkaline Phosphatase 51      AST 33      ALT 36      Anion Gap 8      eGFR >60.0     TROPONIN I    Troponin I <0.012     NT-PRO NATRIURETIC PEPTIDE   NT-PRO NATRIURETIC PEPTIDE    NT-proBNP 64            Imaging Results              X-Ray Chest PA And Lateral (Final result)  Result time 10/02/24 09:28:00      Final result by Ligia Preciado MD (Timothy) (10/02/24 09:28:00)                   Impression:      No acute findings.      Electronically signed by: Ligia Preciado MD  Date:    10/02/2024  Time:    09:28               Narrative:    EXAMINATION:  XR CHEST PA AND LATERAL    CLINICAL HISTORY  <Diagnosis>, Chest Pain;    COMPARISON:  None    FINDINGS:  Two views.  The heart size is normal.  The lung fields are clear.  No acute cardiopulmonary infiltrative.                                       Medications - No data to display  Medical Decision Making  Patient presents with concern of continued cough that began roughly 1 week ago.  Afebrile with vitals grossly unremarkable.  Patient in no distress on exam    DDx:  Including but not limited to viral, URI, allergy/irritant, bronchitis, pneumonia, pneumothorax, pleural effusion, less likely ACS/PE    Amount and/or Complexity of Data Reviewed  Labs: ordered. Decision-making details documented in ED Course.  Radiology: ordered. Decision-making details documented in ED Course.               ED Course as of 10/02/24 1102   Wed Oct 02, 2024   0927 Independent EKG interpretation, sinus bradycardia, rate 58, normal intervals, normal axis, no STEMI, T-wave flattening anterior lateral leads [CS]   1011 CBC auto differential  WNL [KS]   1011 Comprehensive metabolic panel  WNL [KS]   1011 NT-Pro Natriuretic Peptide  WNL [KS]   1011 X-Ray Chest PA And Lateral  No acute cardiopulmonary findings per Radiology review. [KS]   1012 Temp: 98 °F (36.7 °C) [KS]   1012 Pulse: 62 [KS]   1012 Resp: 20 [KS]   1012 SpO2: 96 % [KS]   1058 Troponin I #1  WNL [KS]   1058 Patient  continues to rest comfortably.  With careful consideration, I do have higher suspicion patient's presenting symptoms are due to bronchitis.  No focal consolidations on x-ray or signs of pneumonia.  No elevation in WBC.  Cardiac workup unremarkable today.  Will continue with supportive care.  Short prescription written for prednisone along with Tessalon Perles.  She will continue her home albuterol inhaler as needed for wheezing, if wheezing does occurred.  No wheezing on today's exam.  Encouraged hydration and close outpatient follow-up.  ED return precautions were discussed at length.  Patient states her understanding and agrees with plan [KS]      ED Course User Index  [CS] Majo Irby MD  [KS] Don Lozano PA-C                           Clinical Impression:  Final diagnoses:  [R07.9] Chest pain  [R05.9] Cough, unspecified type  [J40] Bronchitis (Primary)          ED Disposition Condition    Discharge Stable          ED Prescriptions       Medication Sig Dispense Start Date End Date Auth. Provider    benzonatate (TESSALON) 100 MG capsule Take 1 capsule (100 mg total) by mouth 3 (three) times daily as needed for Cough. 20 capsule 10/2/2024 10/12/2024 Don Lozano PA-C    predniSONE (DELTASONE) 20 MG tablet Take 2 tablets (40 mg total) by mouth once daily. for 4 days 8 tablet 10/2/2024 10/6/2024 Don Lozano PA-C          Follow-up Information       Follow up With Specialties Details Why Contact Info    Lexi Lincoln, DO Urgent Care   [address] [place] [id number] [phone]               Don Lozano PA-C  10/02/24 1100       Don Lozano PA-C  10/02/24 [id number]

## 2024-10-07 ENCOUNTER — OFFICE VISIT (OUTPATIENT)
Dept: PULMONOLOGY | Facility: CLINIC | Age: 59
End: 2024-10-07
Payer: COMMERCIAL

## 2024-10-07 VITALS
DIASTOLIC BLOOD PRESSURE: 65 MMHG | BODY MASS INDEX: 47.83 KG/M2 | OXYGEN SATURATION: 97 % | SYSTOLIC BLOOD PRESSURE: 118 MMHG | HEIGHT: 65 IN | WEIGHT: 287.06 LBS | HEART RATE: 58 BPM

## 2024-10-07 DIAGNOSIS — F17.210 CIGARETTE SMOKER: ICD-10-CM

## 2024-10-07 DIAGNOSIS — R05.2 SUBACUTE COUGH: Primary | ICD-10-CM

## 2024-10-07 PROCEDURE — 99999 PR PBB SHADOW E&M-EST. PATIENT-LVL III: CPT | Mod: PBBFAC,,, | Performed by: INTERNAL MEDICINE

## 2024-10-07 PROCEDURE — 3074F SYST BP LT 130 MM HG: CPT | Mod: CPTII,S$GLB,, | Performed by: INTERNAL MEDICINE

## 2024-10-07 PROCEDURE — 3044F HG A1C LEVEL LT 7.0%: CPT | Mod: CPTII,S$GLB,, | Performed by: INTERNAL MEDICINE

## 2024-10-07 PROCEDURE — 3078F DIAST BP <80 MM HG: CPT | Mod: CPTII,S$GLB,, | Performed by: INTERNAL MEDICINE

## 2024-10-07 PROCEDURE — 1159F MED LIST DOCD IN RCRD: CPT | Mod: CPTII,S$GLB,, | Performed by: INTERNAL MEDICINE

## 2024-10-07 PROCEDURE — 99204 OFFICE O/P NEW MOD 45 MIN: CPT | Mod: S$GLB,,, | Performed by: INTERNAL MEDICINE

## 2024-10-07 PROCEDURE — 3008F BODY MASS INDEX DOCD: CPT | Mod: CPTII,S$GLB,, | Performed by: INTERNAL MEDICINE

## 2024-10-07 RX ORDER — OMEPRAZOLE 40 MG/1
40 CAPSULE, DELAYED RELEASE ORAL
Qty: 30 CAPSULE | Refills: 11 | Status: SHIPPED | OUTPATIENT
Start: 2024-10-07

## 2024-10-07 NOTE — ASSESSMENT & PLAN NOTE
Likely post viral exacerbated by smoking.  PPI at night  Nasal steroid daily  Strict smoking cessation.  PFTs if symptoms persist beyond 6 months

## 2024-10-07 NOTE — PROGRESS NOTES
Subjective:       Patient ID: Selam Bird is a 58 y.o. female.    Chief Complaint: Shortness of Breath, Cough, and Bronchitis    59 yo female cigarette smoker (quit 5 days ago 2/2 cough severity) who presents for subacute cough with waxing and waning since COVID in July. Prior o July no respiratory symptoms at all. Has 15 pack year smoking history. No history of lung disease. No medications associated with cough. No clear response to prescribed medications including steroids, doxy, tessalon.    No occupational exposures.    Shortness of Breath    Cough  Associated symptoms include shortness of breath.   Review of Systems   Respiratory:  Positive for cough and shortness of breath.        Past Medical History:   Diagnosis Date    Depression     HTN (hypertension)     Hypertension         No family history on file.   If not mentioned in HPI, Family history is reviewed and not contributory    Social History     Tobacco Use    Smoking status: Every Day     Current packs/day: 1.00     Types: Cigarettes    Smokeless tobacco: Never   Substance Use Topics    Alcohol use: Yes     Comment: seldom    Drug use: No        Objective:        Vitals:    10/07/24 0902   BP: 118/65   Pulse: (!) 58     Wt Readings from Last 3 Encounters:   10/07/24 130.2 kg (287 lb 0.6 oz)   10/02/24 127 kg (280 lb)   11/26/21 123.9 kg (273 lb 0.6 oz)     Temp Readings from Last 3 Encounters:   10/02/24 98 °F (36.7 °C) (Oral)   11/26/21 98.3 °F (36.8 °C)   11/22/21 98.3 °F (36.8 °C) (Oral)     BP Readings from Last 3 Encounters:   10/07/24 118/65   10/02/24 (!) 157/71   11/26/21 130/80     Pulse Readings from Last 3 Encounters:   10/07/24 (!) 58   10/02/24 62   11/26/21 90       Physical Exam   Constitutional: She is oriented to person, place, and time. She appears well-developed and well-nourished. She is obese.   HENT:   Head: Normocephalic.   Mouth/Throat: Oropharynx is clear and moist.   Cardiovascular: Normal rate, regular rhythm and normal  heart sounds.   Pulmonary/Chest: Normal expansion, symmetric chest wall expansion, effort normal and breath sounds normal.   Abdominal: Soft. She exhibits no distension.   Musculoskeletal:         General: No edema. Normal range of motion.      Cervical back: Neck supple.   Lymphadenopathy:     She has no cervical adenopathy.   Neurological: She is alert and oriented to person, place, and time. Gait normal.   Skin: Skin is warm and dry. No rash noted.   Psychiatric: She has a normal mood and affect. Her behavior is normal. Judgment and thought content normal.   Vitals reviewed.    CBC  Lab Results   Component Value Date    WBC 8.49 10/02/2024    HGB 13.0 10/02/2024    HCT 40.2 10/02/2024    MCV 92 10/02/2024     10/02/2024         CMP  Sodium   Date Value Ref Range Status   10/02/2024 139 136 - 145 mmol/L Final     Potassium   Date Value Ref Range Status   10/02/2024 3.7 3.5 - 5.1 mmol/L Final     Chloride   Date Value Ref Range Status   10/02/2024 105 95 - 110 mmol/L Final     CO2   Date Value Ref Range Status   10/02/2024 26 23 - 29 mmol/L Final     Glucose   Date Value Ref Range Status   10/02/2024 99 70 - 110 mg/dL Final     BUN   Date Value Ref Range Status   10/02/2024 17 7 - 17 mg/dL Final     Creatinine   Date Value Ref Range Status   10/02/2024 0.75 0.50 - 1.40 mg/dL Final     Calcium   Date Value Ref Range Status   10/02/2024 9.6 8.7 - 10.5 mg/dL Final     Total Protein   Date Value Ref Range Status   10/02/2024 6.9 6.0 - 8.4 g/dL Final     Albumin   Date Value Ref Range Status   10/02/2024 4.3 3.5 - 5.2 g/dL Final     Total Bilirubin   Date Value Ref Range Status   10/02/2024 0.7 0.1 - 1.0 mg/dL Final     Comment:     For infants and newborns, interpretation of results should be based  on gestational age, weight and in agreement with clinical  observations.    Premature Infant recommended reference ranges:  Up to 24 hours.............<8.0 mg/dL  Up to 48 hours............<12.0 mg/dL  3-5  "days..................<15.0 mg/dL  6-29 days.................<15.0 mg/dL       Alkaline Phosphatase   Date Value Ref Range Status   10/02/2024 51 38 - 126 U/L Final     AST   Date Value Ref Range Status   10/02/2024 33 15 - 46 U/L Final     ALT   Date Value Ref Range Status   10/02/2024 36 10 - 44 U/L Final     Anion Gap   Date Value Ref Range Status   10/02/2024 8 8 - 16 mmol/L Final     eGFR   Date Value Ref Range Status   10/02/2024 >60.0 >60 mL/min/1.73 m^2 Final   07/19/2024 63 > OR = 60 mL/min/1.73m2 Final       ABG  No results found for: "PH", "PO2", "PCO2"        Personal Diagnostic Review  I have personally reviewed the following data and added my own interpretation as below:  CXR 10/2/24 images personally reviewed and shows clear lung fields without acute process.  CBC, BMP reviewed  UC and PCP notes reviewed      10/7/2024     9:02 AM 10/2/2024     8:56 AM 11/26/2021     1:53 PM 11/22/2021     3:55 PM 11/3/2021     9:57 AM 1/29/2021    11:49 AM 11/25/2020     8:49 AM   Pulmonary Function Tests   SpO2 97 % 96 % 98 % 99 % 99 % 95 % 95 %   Height 5' 5" (1.651 m) 5' 5" (1.651 m) 5' 5" (1.651 m) 5' 5" (1.651 m) 5' 5" (1.651 m) 5' 5" (1.651 m) 5' 5" (1.651 m)   Weight 130.2 kg (287 lb 0.6 oz) 127 kg (280 lb) 123.9 kg (273 lb 0.6 oz) 125.3 kg (276 lb 2 oz) 122.5 kg (270 lb) 117.4 kg (258 lb 14.9 oz) 113.9 kg (251 lb 1.7 oz)   BMI (Calculated) 47.8 46.6 45.4 45.9 44.9 43.1 41.8         Assessment:       1. Subacute cough    2. Cigarette smoker        Outpatient Encounter Medications as of 10/7/2024   Medication Sig Dispense Refill    atorvastatin (LIPITOR) 20 MG tablet TAKE 1 TABLET(20 MG) BY MOUTH EVERY DAY 90 tablet 3    benzonatate (TESSALON) 100 MG capsule Take 1 capsule (100 mg total) by mouth 3 (three) times daily as needed for Cough. 20 capsule 0    buPROPion (WELLBUTRIN XL) 150 MG TB24 tablet TAKE 1 TABLET(150 MG) BY MOUTH EVERY DAY 90 tablet 3    citalopram (CELEXA) 20 MG tablet TAKE 1 AND 1/2 " TABLETS BY MOUTH DAILY 135 tablet 2    ibuprofen (ADVIL,MOTRIN) 800 MG tablet TAKE 1 TABLET(800 MG) BY MOUTH THREE TIMES DAILY 21 tablet 3    losartan-hydrochlorothiazide 100-25 mg (HYZAAR) 100-25 mg per tablet TAKE 1 TABLET BY MOUTH EVERY DAY 90 tablet 3    [DISCONTINUED] predniSONE (DELTASONE) 20 MG tablet Take 2 tablets (40 mg total) by mouth once daily. for 4 days 8 tablet 0    omeprazole (PRILOSEC) 40 MG capsule Take 1 capsule (40 mg total) by mouth daily with dinner or evening meal. 30 capsule 11    [DISCONTINUED] HYDROcodone-acetaminophen (NORCO)  mg per tablet Take 1 tablet by mouth every 8 (eight) hours as needed for Pain. 30 tablet 0     No facility-administered encounter medications on file as of 10/7/2024.     1. Subacute cough    2. Cigarette smoker    Plan:     Problem List Items Addressed This Visit          Pulmonary    Subacute cough - Primary    Current Assessment & Plan     Likely post viral exacerbated by smoking.  PPI at night  Nasal steroid daily  Strict smoking cessation.  PFTs if symptoms persist beyond 6 months            Other    Cigarette smoker    Current Assessment & Plan     Discussed barriers to quitting. LDCT yearly, next Dec 2024- scheduled at [facility] through her PCP.            Please note Overview Notes are historic documentation. Please review A/P for current updates.  No follow-ups on file.    No future appointments.        Bernard Cruz MD

## 2024-10-07 NOTE — ASSESSMENT & PLAN NOTE
Discussed barriers to quitting. LDCT yearly, next Dec 2024- scheduled at [facility] through her PCP.